# Patient Record
Sex: MALE | Race: WHITE | Employment: OTHER | URBAN - METROPOLITAN AREA
[De-identification: names, ages, dates, MRNs, and addresses within clinical notes are randomized per-mention and may not be internally consistent; named-entity substitution may affect disease eponyms.]

---

## 2021-10-26 ENCOUNTER — LAB ENCOUNTER (OUTPATIENT)
Dept: LAB | Facility: HOSPITAL | Age: 71
End: 2021-10-26
Attending: INTERNAL MEDICINE
Payer: MEDICARE

## 2021-10-26 DIAGNOSIS — I10 ESSENTIAL HYPERTENSION, MALIGNANT: Primary | ICD-10-CM

## 2021-10-26 DIAGNOSIS — Z87.891 PERSONAL HISTORY OF TOBACCO USE, PRESENTING HAZARDS TO HEALTH: ICD-10-CM

## 2021-10-26 DIAGNOSIS — I25.10 CORONARY ATHEROSCLEROSIS OF NATIVE CORONARY ARTERY: ICD-10-CM

## 2021-10-26 PROCEDURE — 85025 COMPLETE CBC W/AUTO DIFF WBC: CPT

## 2021-10-26 PROCEDURE — 80053 COMPREHEN METABOLIC PANEL: CPT

## 2021-10-26 PROCEDURE — 84443 ASSAY THYROID STIM HORMONE: CPT

## 2021-10-26 PROCEDURE — 36415 COLL VENOUS BLD VENIPUNCTURE: CPT

## 2021-10-26 PROCEDURE — 80061 LIPID PANEL: CPT

## 2023-02-14 ENCOUNTER — LAB ENCOUNTER (OUTPATIENT)
Dept: LAB | Age: 73
End: 2023-02-14
Attending: INTERNAL MEDICINE
Payer: MEDICARE

## 2023-02-14 DIAGNOSIS — I10 HTN (HYPERTENSION): ICD-10-CM

## 2023-02-14 DIAGNOSIS — E78.2 MIXED HYPERLIPIDEMIA: Primary | ICD-10-CM

## 2023-02-14 DIAGNOSIS — I25.10 CAD (CORONARY ARTERY DISEASE): ICD-10-CM

## 2023-02-14 LAB
ALBUMIN SERPL-MCNC: 3.9 G/DL (ref 3.4–5)
ALBUMIN/GLOB SERPL: 1 {RATIO} (ref 1–2)
ALP LIVER SERPL-CCNC: 82 U/L
ALT SERPL-CCNC: 45 U/L
ANION GAP SERPL CALC-SCNC: 2 MMOL/L (ref 0–18)
AST SERPL-CCNC: 29 U/L (ref 15–37)
BILIRUB SERPL-MCNC: 0.5 MG/DL (ref 0.1–2)
BUN BLD-MCNC: 19 MG/DL (ref 7–18)
CALCIUM BLD-MCNC: 9 MG/DL (ref 8.5–10.1)
CHLORIDE SERPL-SCNC: 105 MMOL/L (ref 98–112)
CHOLEST SERPL-MCNC: 185 MG/DL (ref ?–200)
CO2 SERPL-SCNC: 29 MMOL/L (ref 21–32)
CREAT BLD-MCNC: 0.98 MG/DL
FASTING PATIENT LIPID ANSWER: YES
FASTING STATUS PATIENT QL REPORTED: YES
GFR SERPLBLD BASED ON 1.73 SQ M-ARVRAT: 81 ML/MIN/1.73M2 (ref 60–?)
GLOBULIN PLAS-MCNC: 3.8 G/DL (ref 2.8–4.4)
GLUCOSE BLD-MCNC: 240 MG/DL (ref 70–99)
HDLC SERPL-MCNC: 48 MG/DL (ref 40–59)
LDLC SERPL CALC-MCNC: 113 MG/DL (ref ?–100)
NONHDLC SERPL-MCNC: 137 MG/DL (ref ?–130)
OSMOLALITY SERPL CALC.SUM OF ELEC: 292 MOSM/KG (ref 275–295)
POTASSIUM SERPL-SCNC: 4.5 MMOL/L (ref 3.5–5.1)
PROT SERPL-MCNC: 7.7 G/DL (ref 6.4–8.2)
SODIUM SERPL-SCNC: 136 MMOL/L (ref 136–145)
TRIGL SERPL-MCNC: 134 MG/DL (ref 30–149)
VLDLC SERPL CALC-MCNC: 23 MG/DL (ref 0–30)

## 2023-02-14 PROCEDURE — 80053 COMPREHEN METABOLIC PANEL: CPT

## 2023-02-14 PROCEDURE — 36415 COLL VENOUS BLD VENIPUNCTURE: CPT

## 2023-02-14 PROCEDURE — 80061 LIPID PANEL: CPT

## 2024-07-12 ENCOUNTER — LAB ENCOUNTER (OUTPATIENT)
Dept: LAB | Age: 74
End: 2024-07-12
Attending: INTERNAL MEDICINE
Payer: MEDICARE

## 2024-07-12 DIAGNOSIS — E78.2 MIXED HYPERLIPIDEMIA: Primary | ICD-10-CM

## 2024-07-12 DIAGNOSIS — I10 HTN (HYPERTENSION): ICD-10-CM

## 2024-07-12 LAB
ALBUMIN SERPL-MCNC: 3.9 G/DL (ref 3.4–5)
ALBUMIN/GLOB SERPL: 1 {RATIO} (ref 1–2)
ALP LIVER SERPL-CCNC: 82 U/L
ALT SERPL-CCNC: 36 U/L
ANION GAP SERPL CALC-SCNC: 5 MMOL/L (ref 0–18)
AST SERPL-CCNC: 16 U/L (ref 15–37)
BILIRUB SERPL-MCNC: 0.3 MG/DL (ref 0.1–2)
BUN BLD-MCNC: 25 MG/DL (ref 9–23)
CALCIUM BLD-MCNC: 9.4 MG/DL (ref 8.5–10.1)
CHLORIDE SERPL-SCNC: 103 MMOL/L (ref 98–112)
CHOLEST SERPL-MCNC: 186 MG/DL (ref ?–200)
CO2 SERPL-SCNC: 28 MMOL/L (ref 21–32)
CREAT BLD-MCNC: 1.17 MG/DL
EGFRCR SERPLBLD CKD-EPI 2021: 65 ML/MIN/1.73M2 (ref 60–?)
FASTING PATIENT LIPID ANSWER: YES
FASTING STATUS PATIENT QL REPORTED: YES
GLOBULIN PLAS-MCNC: 4 G/DL (ref 2.8–4.4)
GLUCOSE BLD-MCNC: 381 MG/DL (ref 70–99)
HDLC SERPL-MCNC: 55 MG/DL (ref 40–59)
LDLC SERPL CALC-MCNC: 97 MG/DL (ref ?–100)
NONHDLC SERPL-MCNC: 131 MG/DL (ref ?–130)
OSMOLALITY SERPL CALC.SUM OF ELEC: 302 MOSM/KG (ref 275–295)
POTASSIUM SERPL-SCNC: 4.4 MMOL/L (ref 3.5–5.1)
PROT SERPL-MCNC: 7.9 G/DL (ref 6.4–8.2)
SODIUM SERPL-SCNC: 136 MMOL/L (ref 136–145)
TRIGL SERPL-MCNC: 202 MG/DL (ref 30–149)
VLDLC SERPL CALC-MCNC: 33 MG/DL (ref 0–30)

## 2024-07-12 PROCEDURE — 36415 COLL VENOUS BLD VENIPUNCTURE: CPT

## 2024-07-12 PROCEDURE — 80053 COMPREHEN METABOLIC PANEL: CPT

## 2024-07-12 PROCEDURE — 80061 LIPID PANEL: CPT

## 2024-07-15 ENCOUNTER — HOSPITAL ENCOUNTER (INPATIENT)
Facility: HOSPITAL | Age: 74
LOS: 2 days | Discharge: HOME | End: 2024-07-17
Attending: EMERGENCY MEDICINE | Admitting: HOSPITALIST
Payer: MEDICARE

## 2024-07-15 ENCOUNTER — APPOINTMENT (OUTPATIENT)
Dept: MRI IMAGING | Facility: HOSPITAL | Age: 74
End: 2024-07-15
Attending: EMERGENCY MEDICINE
Payer: MEDICARE

## 2024-07-15 ENCOUNTER — APPOINTMENT (OUTPATIENT)
Dept: CT IMAGING | Facility: HOSPITAL | Age: 74
End: 2024-07-15
Attending: EMERGENCY MEDICINE
Payer: MEDICARE

## 2024-07-15 DIAGNOSIS — R29.810 FACIAL DROOP: Primary | ICD-10-CM

## 2024-07-15 DIAGNOSIS — E11.65 TYPE 2 DIABETES MELLITUS WITH HYPERGLYCEMIA, WITHOUT LONG-TERM CURRENT USE OF INSULIN (HCC): ICD-10-CM

## 2024-07-15 DIAGNOSIS — R26.81 GAIT INSTABILITY: ICD-10-CM

## 2024-07-15 PROBLEM — I10 HYPERTENSION: Status: ACTIVE | Noted: 2024-07-15

## 2024-07-15 PROBLEM — E11.9 DIABETES MELLITUS (HCC): Status: ACTIVE | Noted: 2024-07-15

## 2024-07-15 LAB
ALBUMIN SERPL-MCNC: 3.6 G/DL (ref 3.4–5)
ALBUMIN/GLOB SERPL: 0.8 {RATIO} (ref 1–2)
ALP LIVER SERPL-CCNC: 87 U/L
ALT SERPL-CCNC: 30 U/L
ANION GAP SERPL CALC-SCNC: 6 MMOL/L (ref 0–18)
APTT PPP: 26.8 SECONDS (ref 23–36)
AST SERPL-CCNC: 17 U/L (ref 15–37)
ATRIAL RATE: 60 BPM
BASOPHILS # BLD AUTO: 0.09 X10(3) UL (ref 0–0.2)
BASOPHILS NFR BLD AUTO: 0.9 %
BILIRUB SERPL-MCNC: 0.6 MG/DL (ref 0.1–2)
BILIRUB UR QL STRIP.AUTO: NEGATIVE
BUN BLD-MCNC: 26 MG/DL (ref 9–23)
CALCIUM BLD-MCNC: 9.5 MG/DL (ref 8.5–10.1)
CHLORIDE SERPL-SCNC: 104 MMOL/L (ref 98–112)
CLARITY UR REFRACT.AUTO: CLEAR
CO2 SERPL-SCNC: 26 MMOL/L (ref 21–32)
COLOR UR AUTO: YELLOW
CREAT BLD-MCNC: 1.03 MG/DL
EGFRCR SERPLBLD CKD-EPI 2021: 76 ML/MIN/1.73M2 (ref 60–?)
EOSINOPHIL # BLD AUTO: 0.57 X10(3) UL (ref 0–0.7)
EOSINOPHIL NFR BLD AUTO: 6 %
ERYTHROCYTE [DISTWIDTH] IN BLOOD BY AUTOMATED COUNT: 12.3 %
EST. AVERAGE GLUCOSE BLD GHB EST-MCNC: 249 MG/DL (ref 68–126)
GLOBULIN PLAS-MCNC: 4.3 G/DL (ref 2.8–4.4)
GLUCOSE BLD-MCNC: 263 MG/DL (ref 70–99)
GLUCOSE BLD-MCNC: 296 MG/DL (ref 70–99)
GLUCOSE BLD-MCNC: 343 MG/DL (ref 70–99)
GLUCOSE BLD-MCNC: 362 MG/DL (ref 70–99)
GLUCOSE UR STRIP.AUTO-MCNC: >1000 MG/DL
HBA1C MFR BLD: 10.3 % (ref ?–5.7)
HCT VFR BLD AUTO: 39.2 %
HGB BLD-MCNC: 14.2 G/DL
IMM GRANULOCYTES # BLD AUTO: 0.03 X10(3) UL (ref 0–1)
IMM GRANULOCYTES NFR BLD: 0.3 %
INR BLD: 0.9 (ref 0.8–1.2)
KETONES UR STRIP.AUTO-MCNC: NEGATIVE MG/DL
LEUKOCYTE ESTERASE UR QL STRIP.AUTO: NEGATIVE
LYMPHOCYTES # BLD AUTO: 2.61 X10(3) UL (ref 1–4)
LYMPHOCYTES NFR BLD AUTO: 27.4 %
MCH RBC QN AUTO: 32.4 PG (ref 26–34)
MCHC RBC AUTO-ENTMCNC: 36.2 G/DL (ref 31–37)
MCV RBC AUTO: 89.5 FL
MONOCYTES # BLD AUTO: 0.79 X10(3) UL (ref 0.1–1)
MONOCYTES NFR BLD AUTO: 8.3 %
NEUTROPHILS # BLD AUTO: 5.42 X10 (3) UL (ref 1.5–7.7)
NEUTROPHILS # BLD AUTO: 5.42 X10(3) UL (ref 1.5–7.7)
NEUTROPHILS NFR BLD AUTO: 57.1 %
NITRITE UR QL STRIP.AUTO: NEGATIVE
OSMOLALITY SERPL CALC.SUM OF ELEC: 300 MOSM/KG (ref 275–295)
P AXIS: 17 DEGREES
P-R INTERVAL: 160 MS
PH UR STRIP.AUTO: 5.5 [PH] (ref 5–8)
PLATELET # BLD AUTO: 210 10(3)UL (ref 150–450)
POTASSIUM SERPL-SCNC: 4.2 MMOL/L (ref 3.5–5.1)
PROT SERPL-MCNC: 7.9 G/DL (ref 6.4–8.2)
PROTHROMBIN TIME: 12.1 SECONDS (ref 11.6–14.8)
Q-T INTERVAL: 412 MS
QRS DURATION: 94 MS
QTC CALCULATION (BEZET): 412 MS
R AXIS: -31 DEGREES
RBC # BLD AUTO: 4.38 X10(6)UL
RBC UR QL AUTO: NEGATIVE
SODIUM SERPL-SCNC: 136 MMOL/L (ref 136–145)
SP GR UR STRIP.AUTO: >1.03 (ref 1–1.03)
T AXIS: 46 DEGREES
UROBILINOGEN UR STRIP.AUTO-MCNC: NORMAL MG/DL
VENTRICULAR RATE: 60 BPM
WBC # BLD AUTO: 9.5 X10(3) UL (ref 4–11)

## 2024-07-15 PROCEDURE — 99223 1ST HOSP IP/OBS HIGH 75: CPT | Performed by: STUDENT IN AN ORGANIZED HEALTH CARE EDUCATION/TRAINING PROGRAM

## 2024-07-15 PROCEDURE — 70551 MRI BRAIN STEM W/O DYE: CPT | Performed by: EMERGENCY MEDICINE

## 2024-07-15 PROCEDURE — 70498 CT ANGIOGRAPHY NECK: CPT | Performed by: EMERGENCY MEDICINE

## 2024-07-15 PROCEDURE — 70496 CT ANGIOGRAPHY HEAD: CPT | Performed by: EMERGENCY MEDICINE

## 2024-07-15 RX ORDER — ACETAMINOPHEN 500 MG
1000 TABLET ORAL EVERY 8 HOURS PRN
Status: DISCONTINUED | OUTPATIENT
Start: 2024-07-15 | End: 2024-07-17

## 2024-07-15 RX ORDER — AMLODIPINE BESYLATE 2.5 MG/1
2.5 TABLET ORAL 2 TIMES DAILY
COMMUNITY

## 2024-07-15 RX ORDER — ASPIRIN 325 MG
325 TABLET, DELAYED RELEASE (ENTERIC COATED) ORAL ONCE
Status: COMPLETED | OUTPATIENT
Start: 2024-07-15 | End: 2024-07-15

## 2024-07-15 RX ORDER — DEXTROSE MONOHYDRATE 25 G/50ML
50 INJECTION, SOLUTION INTRAVENOUS
Status: DISCONTINUED | OUTPATIENT
Start: 2024-07-15 | End: 2024-07-17

## 2024-07-15 RX ORDER — ASPIRIN 325 MG
325 TABLET ORAL DAILY
Status: DISCONTINUED | OUTPATIENT
Start: 2024-07-16 | End: 2024-07-17

## 2024-07-15 RX ORDER — ECHINACEA PURPUREA EXTRACT 125 MG
1 TABLET ORAL
Status: DISCONTINUED | OUTPATIENT
Start: 2024-07-15 | End: 2024-07-17

## 2024-07-15 RX ORDER — ATORVASTATIN CALCIUM 40 MG/1
40 TABLET, FILM COATED ORAL NIGHTLY
Status: DISCONTINUED | OUTPATIENT
Start: 2024-07-15 | End: 2024-07-15

## 2024-07-15 RX ORDER — METOPROLOL SUCCINATE 50 MG/1
50 TABLET, EXTENDED RELEASE ORAL
Status: DISCONTINUED | OUTPATIENT
Start: 2024-07-16 | End: 2024-07-17

## 2024-07-15 RX ORDER — ATORVASTATIN CALCIUM 40 MG/1
40 TABLET, FILM COATED ORAL NIGHTLY
Status: DISCONTINUED | OUTPATIENT
Start: 2024-07-15 | End: 2024-07-17

## 2024-07-15 RX ORDER — LABETALOL HYDROCHLORIDE 5 MG/ML
10 INJECTION, SOLUTION INTRAVENOUS EVERY 10 MIN PRN
Status: DISCONTINUED | OUTPATIENT
Start: 2024-07-15 | End: 2024-07-17

## 2024-07-15 RX ORDER — BENZONATATE 100 MG/1
200 CAPSULE ORAL 3 TIMES DAILY PRN
Status: DISCONTINUED | OUTPATIENT
Start: 2024-07-15 | End: 2024-07-17

## 2024-07-15 RX ORDER — BISACODYL 10 MG
10 SUPPOSITORY, RECTAL RECTAL
Status: DISCONTINUED | OUTPATIENT
Start: 2024-07-15 | End: 2024-07-17

## 2024-07-15 RX ORDER — LOSARTAN POTASSIUM 50 MG/1
50 TABLET ORAL DAILY
Status: DISCONTINUED | OUTPATIENT
Start: 2024-07-16 | End: 2024-07-17

## 2024-07-15 RX ORDER — AMLODIPINE BESYLATE 2.5 MG/1
2.5 TABLET ORAL 2 TIMES DAILY
Status: DISCONTINUED | OUTPATIENT
Start: 2024-07-15 | End: 2024-07-17

## 2024-07-15 RX ORDER — POLYETHYLENE GLYCOL 3350 17 G/17G
17 POWDER, FOR SOLUTION ORAL DAILY PRN
Status: DISCONTINUED | OUTPATIENT
Start: 2024-07-15 | End: 2024-07-17

## 2024-07-15 RX ORDER — SENNOSIDES 8.6 MG
17.2 TABLET ORAL NIGHTLY PRN
Status: DISCONTINUED | OUTPATIENT
Start: 2024-07-15 | End: 2024-07-17

## 2024-07-15 RX ORDER — DEXTROSE MONOHYDRATE 25 G/50ML
50 INJECTION, SOLUTION INTRAVENOUS
Status: CANCELLED | OUTPATIENT
Start: 2024-07-15

## 2024-07-15 RX ORDER — LOSARTAN POTASSIUM 50 MG/1
50 TABLET ORAL DAILY
COMMUNITY

## 2024-07-15 RX ORDER — ASPIRIN 300 MG/1
300 SUPPOSITORY RECTAL DAILY
Status: DISCONTINUED | OUTPATIENT
Start: 2024-07-16 | End: 2024-07-17

## 2024-07-15 RX ORDER — NICOTINE POLACRILEX 4 MG
15 LOZENGE BUCCAL
Status: CANCELLED | OUTPATIENT
Start: 2024-07-15

## 2024-07-15 RX ORDER — INSULIN ASPART 100 [IU]/ML
5 INJECTION, SOLUTION INTRAVENOUS; SUBCUTANEOUS ONCE
Status: COMPLETED | OUTPATIENT
Start: 2024-07-15 | End: 2024-07-15

## 2024-07-15 RX ORDER — METOCLOPRAMIDE HYDROCHLORIDE 5 MG/ML
10 INJECTION INTRAMUSCULAR; INTRAVENOUS EVERY 8 HOURS PRN
Status: DISCONTINUED | OUTPATIENT
Start: 2024-07-15 | End: 2024-07-17

## 2024-07-15 RX ORDER — NICOTINE POLACRILEX 4 MG
15 LOZENGE BUCCAL
Status: DISCONTINUED | OUTPATIENT
Start: 2024-07-15 | End: 2024-07-17

## 2024-07-15 RX ORDER — NICOTINE POLACRILEX 4 MG
30 LOZENGE BUCCAL
Status: CANCELLED | OUTPATIENT
Start: 2024-07-15

## 2024-07-15 RX ORDER — HYDRALAZINE HYDROCHLORIDE 20 MG/ML
10 INJECTION INTRAMUSCULAR; INTRAVENOUS EVERY 2 HOUR PRN
Status: DISCONTINUED | OUTPATIENT
Start: 2024-07-15 | End: 2024-07-17

## 2024-07-15 RX ORDER — ONDANSETRON 2 MG/ML
4 INJECTION INTRAMUSCULAR; INTRAVENOUS EVERY 6 HOURS PRN
Status: DISCONTINUED | OUTPATIENT
Start: 2024-07-15 | End: 2024-07-17

## 2024-07-15 RX ORDER — NICOTINE POLACRILEX 4 MG
30 LOZENGE BUCCAL
Status: DISCONTINUED | OUTPATIENT
Start: 2024-07-15 | End: 2024-07-17

## 2024-07-15 RX ORDER — INSULIN DEGLUDEC 100 U/ML
10 INJECTION, SOLUTION SUBCUTANEOUS NIGHTLY
Status: DISCONTINUED | OUTPATIENT
Start: 2024-07-16 | End: 2024-07-16

## 2024-07-15 RX ORDER — ENOXAPARIN SODIUM 100 MG/ML
40 INJECTION SUBCUTANEOUS DAILY
Status: DISCONTINUED | OUTPATIENT
Start: 2024-07-16 | End: 2024-07-17

## 2024-07-15 RX ORDER — HYDRALAZINE HYDROCHLORIDE 20 MG/ML
5 INJECTION INTRAMUSCULAR; INTRAVENOUS EVERY 6 HOURS PRN
Status: DISCONTINUED | OUTPATIENT
Start: 2024-07-15 | End: 2024-07-17

## 2024-07-15 RX ORDER — METOPROLOL SUCCINATE 50 MG/1
50 TABLET, EXTENDED RELEASE ORAL DAILY
COMMUNITY

## 2024-07-15 RX ORDER — ENEMA 19; 7 G/133ML; G/133ML
1 ENEMA RECTAL ONCE AS NEEDED
Status: DISCONTINUED | OUTPATIENT
Start: 2024-07-15 | End: 2024-07-17

## 2024-07-15 RX ORDER — MELATONIN
3 NIGHTLY PRN
Status: DISCONTINUED | OUTPATIENT
Start: 2024-07-15 | End: 2024-07-17

## 2024-07-15 RX ORDER — MECLIZINE HYDROCHLORIDE 25 MG/1
25 TABLET ORAL 2 TIMES DAILY
COMMUNITY

## 2024-07-15 RX ORDER — SODIUM CHLORIDE 9 MG/ML
INJECTION, SOLUTION INTRAVENOUS CONTINUOUS
Status: DISCONTINUED | OUTPATIENT
Start: 2024-07-15 | End: 2024-07-17

## 2024-07-15 NOTE — ED INITIAL ASSESSMENT (HPI)
Patient states his blood pressure medication Amlodipine was doubled on Wednesday, took a nap, woke up and felt \"drunk as a skunk.\" Off balance. Seen by his PCP on Friday, given Meclizine, with no relief. Labs drawn on Friday, states they were normal other than YT=198. No history of diabetes. States his voice is very weak. Vomiting on Wednesday. Urinating every hour and very thirsty. Patient has pain across upper back, neck.

## 2024-07-16 ENCOUNTER — APPOINTMENT (OUTPATIENT)
Dept: CV DIAGNOSTICS | Facility: HOSPITAL | Age: 74
End: 2024-07-16
Attending: STUDENT IN AN ORGANIZED HEALTH CARE EDUCATION/TRAINING PROGRAM
Payer: MEDICARE

## 2024-07-16 LAB
CHOLEST SERPL-MCNC: 140 MG/DL (ref ?–200)
GLUCOSE BLD-MCNC: 244 MG/DL (ref 70–99)
GLUCOSE BLD-MCNC: 260 MG/DL (ref 70–99)
GLUCOSE BLD-MCNC: 261 MG/DL (ref 70–99)
GLUCOSE BLD-MCNC: 307 MG/DL (ref 70–99)
HDLC SERPL-MCNC: 43 MG/DL (ref 40–59)
LDLC SERPL CALC-MCNC: 63 MG/DL (ref ?–100)
NONHDLC SERPL-MCNC: 97 MG/DL (ref ?–130)
TRIGL SERPL-MCNC: 207 MG/DL (ref 30–149)
VLDLC SERPL CALC-MCNC: 31 MG/DL (ref 0–30)

## 2024-07-16 PROCEDURE — 99233 SBSQ HOSP IP/OBS HIGH 50: CPT | Performed by: HOSPITALIST

## 2024-07-16 PROCEDURE — 93306 TTE W/DOPPLER COMPLETE: CPT | Performed by: STUDENT IN AN ORGANIZED HEALTH CARE EDUCATION/TRAINING PROGRAM

## 2024-07-16 PROCEDURE — 99233 SBSQ HOSP IP/OBS HIGH 50: CPT | Performed by: CLINICAL NURSE SPECIALIST

## 2024-07-16 PROCEDURE — 99223 1ST HOSP IP/OBS HIGH 75: CPT | Performed by: OTHER

## 2024-07-16 RX ORDER — BLOOD-GLUCOSE METER
EACH MISCELLANEOUS
Qty: 1 KIT | Refills: 0 | Status: SHIPPED | OUTPATIENT
Start: 2024-07-16 | End: 2024-07-17

## 2024-07-16 RX ORDER — BLOOD-GLUCOSE METER
EACH MISCELLANEOUS
Qty: 1 KIT | Refills: 0 | Status: SHIPPED | OUTPATIENT
Start: 2024-07-16 | End: 2024-07-16

## 2024-07-16 RX ORDER — CLOPIDOGREL BISULFATE 75 MG/1
75 TABLET ORAL DAILY
Status: DISCONTINUED | OUTPATIENT
Start: 2024-07-16 | End: 2024-07-17

## 2024-07-16 RX ORDER — LANCETS
EACH MISCELLANEOUS
Qty: 102 EACH | Refills: 6 | Status: SHIPPED | OUTPATIENT
Start: 2024-07-16 | End: 2024-07-16

## 2024-07-16 RX ORDER — BLOOD SUGAR DIAGNOSTIC
STRIP MISCELLANEOUS
Qty: 100 STRIP | Refills: 6 | Status: SHIPPED | OUTPATIENT
Start: 2024-07-16 | End: 2024-07-16

## 2024-07-16 RX ORDER — LANCETS 33 GAUGE
EACH MISCELLANEOUS
Qty: 200 EACH | Refills: 6 | Status: SHIPPED | OUTPATIENT
Start: 2024-07-16 | End: 2024-07-16

## 2024-07-16 RX ORDER — BLOOD SUGAR DIAGNOSTIC
STRIP MISCELLANEOUS
Qty: 200 STRIP | Refills: 6 | Status: SHIPPED | OUTPATIENT
Start: 2024-07-16 | End: 2024-07-16

## 2024-07-16 RX ORDER — INSULIN DEGLUDEC 100 U/ML
18 INJECTION, SOLUTION SUBCUTANEOUS NIGHTLY
Status: DISCONTINUED | OUTPATIENT
Start: 2024-07-16 | End: 2024-07-17

## 2024-07-16 RX ORDER — LANCETS 33 GAUGE
EACH MISCELLANEOUS
Qty: 100 EACH | Refills: 6 | Status: SHIPPED | OUTPATIENT
Start: 2024-07-16 | End: 2024-07-17

## 2024-07-16 RX ORDER — ASPIRIN 325 MG
325 TABLET ORAL DAILY
Qty: 30 TABLET | Refills: 1 | Status: SHIPPED | OUTPATIENT
Start: 2024-07-17

## 2024-07-16 RX ORDER — PEN NEEDLE, DIABETIC 32GX 5/32"
NEEDLE, DISPOSABLE MISCELLANEOUS
Qty: 100 EACH | Refills: 6 | Status: SHIPPED | OUTPATIENT
Start: 2024-07-16 | End: 2024-07-16

## 2024-07-16 RX ORDER — BLOOD SUGAR DIAGNOSTIC
STRIP MISCELLANEOUS
Qty: 200 STRIP | Refills: 6 | Status: SHIPPED | OUTPATIENT
Start: 2024-07-16 | End: 2024-07-17

## 2024-07-16 RX ORDER — LANCETS 33 GAUGE
EACH MISCELLANEOUS
Qty: 100 EACH | Refills: 6 | Status: SHIPPED | OUTPATIENT
Start: 2024-07-16 | End: 2024-07-16

## 2024-07-16 RX ORDER — INSULIN ASPART 100 [IU]/ML
INJECTION, SOLUTION INTRAVENOUS; SUBCUTANEOUS
Qty: 5 EACH | Refills: 3 | Status: SHIPPED | OUTPATIENT
Start: 2024-07-16

## 2024-07-16 RX ORDER — PEN NEEDLE, DIABETIC 32GX 5/32"
NEEDLE, DISPOSABLE MISCELLANEOUS
Qty: 100 EACH | Refills: 6 | Status: SHIPPED | OUTPATIENT
Start: 2024-07-16 | End: 2024-07-17

## 2024-07-16 RX ORDER — INSULIN GLARGINE 100 [IU]/ML
18 INJECTION, SOLUTION SUBCUTANEOUS NIGHTLY
Qty: 5 EACH | Refills: 0 | Status: SHIPPED | OUTPATIENT
Start: 2024-07-16 | End: 2024-07-17

## 2024-07-16 RX ORDER — CLOPIDOGREL BISULFATE 75 MG/1
75 TABLET ORAL DAILY
Qty: 30 TABLET | Refills: 1 | Status: SHIPPED | OUTPATIENT
Start: 2024-07-16

## 2024-07-16 NOTE — ED PROVIDER NOTES
Patient Seen in: [facility] Emergency Department      History     Chief Complaint   Patient presents with    Dizziness    Hyperglycemia     Stated Complaint: feeling dizzy/off balance, weak voice, and fatigue since Wednesday. Also report*    Subjective:   HPI    1 week of dizziness further described as gait instability.  No shahbaz vertiginous sensation.  No numbness or weakness in the arms but the wife feels like there is a left-sided facial droop.  No word finding difficulty.  No double vision no visual field deficits.    The gait instability is constant present since it started.    Wife feels that the facial droop is waxing and waning.    Objective:   Past Medical History:    CAD (coronary artery disease)    Hypertension              History reviewed. No pertinent surgical history.             Social History     Socioeconomic History    Marital status:    Tobacco Use    Smoking status: Former     Current packs/day: 0.00     Types: Cigarettes     Quit date: 9/27/2013     Years since quitting: 10.8   Substance and Sexual Activity    Alcohol use: Yes              Review of Systems    Positive for stated Chief Complaint: Dizziness and Hyperglycemia    Other systems are as noted in HPI.  Constitutional and vital signs reviewed.      All other systems reviewed and negative except as noted above.    Physical Exam     ED Triage Vitals   BP 07/15/24 1521 148/88   Pulse 07/15/24 1521 69   Resp 07/15/24 1521 18   Temp 07/15/24 1522 98.4 °F (36.9 °C)   Temp src 07/15/24 1522 Oral   SpO2 07/15/24 1522 96 %   O2 Device 07/15/24 1522 None (Room air)       Current Vitals:   Vital Signs  BP: 133/86  Pulse: 58  Resp: 21  Temp: 98.4 °F (36.9 °C)  Temp src: Oral  MAP (mmHg): (!) 101    Oxygen Therapy  SpO2: 96 %  O2 Device: None (Room air)            Physical Exam      Constitutional:  Appears well-developed and well-nourished. no Distress.  Head: Normocephalic and atraumatic.   Nose: Nose normal.   Eyes: EOM are normal.  Pupils are equal, round, and reactive to light.   Neck: Normal range of motion. Neck supple. No JVD present.   Cardiovascular: Normal rate and regular rhythm.    Pulmonary/Chest: Effort normal and breath sounds normal. No stridor.   Abdominal: Soft. There is no tenderness. There is no guarding.   Musculoskeletal: Exhibits no edema or tenderness.     Neurological: Pt is oriented to person, place, and time.    There are no cranial nerve deficits.    There is no nystagmus.  Speech is fluent and not slurred.   There is no word finding difficulty.    No neglect. No gaze  No visual field deficit.    There is no pronator drift.    Strength is 5 out of 5 in the upper extremities bilaterally.    Sensation is symmetric in the upper extremities and not diminished.    There is no lower extremity drift  Sensation is normal in the lower extremities and not diminished.  It is intact to fine touch.    There is full strength with hip flexion, knee flexion and extension, ankle plantarflexion, and at the EHL.  This is true bilaterally.      Finger to nose intact. '  Gait deferred      Skin: Skin is warm and dry.   Psychiatric: Normal mood and affect. Thought content normal.       ED Course     Labs Reviewed   COMP METABOLIC PANEL (14) - Abnormal; Notable for the following components:       Result Value    Glucose 343 (*)     BUN 26 (*)     Calculated Osmolality 300 (*)     A/G Ratio 0.8 (*)     All other components within normal limits   URINALYSIS, ROUTINE - Abnormal; Notable for the following components:    Spec Gravity >1.030 (*)     Glucose Urine >1000 (*)     Protein Urine Trace (*)     All other components within normal limits   HEMOGLOBIN A1C - Abnormal; Notable for the following components:    HgbA1C 10.3 (*)     Estimated Average Glucose 249 (*)     All other components within normal limits   POCT GLUCOSE - Abnormal; Notable for the following components:    POC Glucose 362 (*)     All other components within normal limits    POCT GLUCOSE - Abnormal; Notable for the following components:    POC Glucose 263 (*)     All other components within normal limits   PROTHROMBIN TIME (PT) - Normal   PTT, ACTIVATED - Normal   CBC WITH DIFFERENTIAL WITH PLATELET    Narrative:     The following orders were created for panel order CBC With Differential With Platelet.  Procedure                               Abnormality         Status                     ---------                               -----------         ------                     CBC W/ DIFFERENTIAL[[id number]]                               Final result                 Please view results for these tests on the individual orders.   RAINBOW DRAW LAVENDER   RAINBOW DRAW LIGHT GREEN   RAINBOW DRAW BLUE   CBC W/ DIFFERENTIAL     EKG    Rate, intervals and axes as noted on EKG Report.  Rate: 60  Rhythm: Sinus Rhythm  Reading: Sinus rhythm no acute ischemia                 CTA BRAIN + CTA CAROTIDS (CPT=70496/12805)    Result Date: 7/15/2024  CONCLUSION:  No acute intracranial abnormality.  Patent intracranial and cervical arterial vasculature, without large vessel occlusion or aneurysm.    LOCATION:  [facility]   Dictated by (CST): Rangel Healy MD on 7/15/2024 at 5:51 PM     Finalized by (CST): Rangel Healy MD on 7/15/2024 at 5:56 PM        Fluoroscopy notable hyperglycemia without DKA.  Fluid, insulin ordered           MDM          Differential diagnoses considered: Stroke, TIA, peripheral vertigo, vertebral artery insufficiency.    -CT/CTA negative for bleeding, mass, vessel abnormality.  -Concern for stroke persist.  MRI ordered.  -Given inability to ambulate will admit.    Patient will be admitted primarily to the [facility] hospitalist.    Care has been discussed with the admitting physician.  Last known well was over 5 days ago.  Stroke scale is 0.    Not a candidate for tPA or lytics.      I visualized the radiology studies, my independent interpretation: Intracranial hemorrhage noted on CT scan  of brain    *Discussion of ongoing management of this patient's care included:  patient will be admitted primarily to the [facility] hospitalist.  *Comorbidities contributing to the complexity of decision making: n/a  *External charts reviewed: n/a  *Additional sources of history: n/a    Shared decision making was done by: patient, myself.    Admission disposition: 7/15/2024  7:19 PM                                        Medical Decision Making      Disposition and Plan     Clinical Impression:  1. Facial droop    2. Gait instability         Disposition:  Admit  7/15/2024  7:19 pm    Follow-up:  No follow-up provider specified.        Medications Prescribed:  Current Discharge Medication List                            Hospital Problems       Present on Admission             ICD-10-CM Noted POA    * (Principal) Facial droop R29.810 7/15/2024 Unknown    Diabetes mellitus (HCC) E11.9 7/15/2024 Unknown    Hypertension I10 7/15/2024 Unknown

## 2024-07-16 NOTE — ED QUICK NOTES
Orders for admission, patient is aware of plan and ready to go upstairs. Any questions, please call ED RN Arianne at extension [phone]    Vaccinated? na  Type of COVID test sent:  COVID Suspicion level: n/a      Titratable drug(s) infusing:   Rate: n/a    LOC at time of transport: A&Ox4    Other pertinent information:     CIWA score=  NIH score=

## 2024-07-16 NOTE — HISTORICAL OFFICE NOTE
Facility Logo [facility]  [address], 4th floor, [place] [address]  [phone]      Tahir Kapoor  Progress Note  Demographics:  Name: Tahir Kapoor YOB: 1950  Age: 73, Male Medical Record No: [id number]  Visited Date/Time: 07/10/2023 10:40 AM    Chief Complaints  3 Month follow up   Blood pressure   History of Present Illness  No recent hospitalization.    He was noted to have elevated blood pressure prior to oral surgery procedure and thus referred to cardiology for further evaluation recommendations.  He has infection in teeth/gum but in no pain.  He has been making conscious effort in eating more vegatables, less red meat and watching sodium intake, lost ~20 pounds.     No chest pain, dyspnea, orthopnea, PND or increased lower extremity pain/swelling.  No palpitations, presyncope or syncope.  No fever, nausea/vomiting, diarrhea or urinary complaints.    HR 50-60s and reviewed log BP with higher reading in AM and afternoon with SBP in the evening 120-140 mmHg and DBP 60-70s  Cardiac risk factors Hypertension, Dyslipidemia and Former smoker    Past Medical History  1.Hyperlipidemia, mixed  2.Hypertension, uncontrolled  3.CAD native (coronary artery disease)  4.History of smoking    Past Surgical History  1.Post PTCA  2.STEMI RCA - right coronary artery (ST elevation myocardial infarct)  3.S/P drug eluting coronary stent placement - DESC  Family History  Family history unknown.    Social History  Smoking status Former [id number]/10/2010 (End Date)  Tobacco usage - No (Non-smoker (finding))  Alcohol usage - Yes (rare every couple weeks )  Review of systems  Constitutional No history of Fatigue  Cardiovascular No history of Chest pain, TIWARI, Palpitations, Syncope, PND, Orthopnea, Edema and Claudication  Musculoskeletal No history of Myalgias and Muscle weakness  Respiratory No history of SOB  Neurological No history of Numbness and Dizziness  Physical  Examination  Vitals Left Arm Sitting  / 84 mmHg, Pulse rate 70 bpm, Height in 5' 9\", BMI: 26.7, Weight in 181 lbs (or) 82 kgs and BSA : 2.01 cc/m²  General Appearance No Acute Distress and Appropriate  Head/Eyes/Ears/Nose/Mouth/Throat Conjunctiva pink, Sclera Clear and Mucous membranes Moist  Neck Normal carotid pulsations, No carotid bruits and No JVD  Respiratory Unlabored, Lungs clear with normal breath sounds and Equal bilaterally  Cardiovascular Intact distal pulses and Regular rhythm. Normal rate present. Normal and normal S1 and S2    Gastrointestinal Abdomen soft and Non-tender  Musculoskeletal Normal spine  Gait Normal gait  Lower Extremities Pulses 2+ and equal bilaterally and No edema  Skin Warm and dry and Intact  Neurologic / Psychiatric Alert and Oriented  Speech Normal speech  EKG/Other abnormalities  [facility] nuclear stress 21 abnormal but only infarct with medium size defect in inferior wall    [facility] US renal artery 21 negative for TATE    [facility] echo 21 LVEF 58%, mild LVH, normal RV, ascending aorta 4.0 cm and aortic root 3.9 cm, no valvular pathology    [facility] labs 23 08:50  Glucose: 240 (H) Sodium: 136 Potassium: 4.5  BUN: 19 (H) CREATININE: 0.98  AST (SGOT): 29 ALT (SGPT): 45  Tholesterol, Total: 185 Triglycerides: 134 HDL Cholesterol: 48 LDL Cholesterol Ca (H)    Results for LUIS F KERR (MRN [id number]) 10/26/2021 09:22  Glucose: 176 (H) Sodium: 139 Potassium: 4.3  BUN: 14 CREATININE: 0.99  AST (SGOT): 21 ALT (SGPT): 43  Cholesterol, Total: 234 (H) Triglycerides: 181 (H) HDL Cholesterol: 45 LDL Cholesterol Ca (H)  TSH: 1.640  WBC: 9.6 Hemoglobin: 14.3 Hematocrit: 42.2 Platelet Count: 240.0  Allergies  No known medication allergies.    Medications (Info obtained by: Verbal)  1.amLODIPine 2.5 mg tablet, Take 1 tablet orally 2 times a day.  2.aspirin 81 mg tablet,delayed release, Take 1 tablet orally once a day.  3.atorvastatin 40 mg tablet, Take 1 tablet orally  once a day at night.  4.losartan 50 mg tablet, Take 1 tablet orally once a day.  5.Metoprolol Succinate ER 50 MG Oral Tablet Extended Release 24 Hour, Take 1 tablet by mouth once daily    Impression  1.Cardiac Risk Assessment exam, preop  2.Dilation of thoracic aorta  3.Hypertension, uncontrolled  4.Post PTCA  5.STEMI RCA - right coronary artery (ST elevation myocardial infarct)  6.S/P drug eluting coronary stent placement - DESC  7.CAD native (coronary artery disease)    Assessment & Plan  73-year-old gentleman prior history of coronary disease with drug-eluting stent to RCA in approximately 2013 at [facility].  He had not follow-up with a cardiologist regularly.  He was noted to have elevated blood pressure prior to oral surgery.    Structural/ischemic evaluation of heart with elevated blood pressure was reassuring late 2021 and suggest f/u in late 2023.    BP limited in increasing Toprol XL due to low HR and started ARB due to mild dilated thoracic aorta and mild LVH.  Will need to increase meds considering increase losartan or amlodipine.    Reviewed previous labs and would benefit from resumption of statin ie atorvastatin 40 mg nightly.  Goal LDL less than 100 but will watch as he makes diet changes.    Will consider PV screening when available through the office.    Plan:  -taking aspirin 81 mg daily  -continue amlodipine 2.5 mg to twice a day (increased at last office visit)  -continue aspirin 81 mg daily  -continue Toprol XL 50 mg but take in the morning  -continue losartan 50 mg in the evening  -continue atorvastatin 40 mg nightly  -follow-up with primary care physician for elevated blood sugar and lab work  -Monitor blood pressure and call if systolic blood pressure greater than 130 mmHg consistently and/or diastolic blood pressure greater than 85 mmHg. DASH diet and blood pressure monitoring information given.  Staff will give DASH initial information.  -cardiac nuclear PET just prior to follow-up  with Dr. Alexander in 6 months    Recommend low sodium diet, daily exercise and maintain a normal BMI. Recommend monitor blood pressure at home.  Labs and Diagnostics ordered  1.Cardiac PET/CT Scan (76887) (5 Months)  Future appointments  1.Follow up visit - Dewayne Alexander (6 Months)  Miscellaneous  1.Reviewed myocardial perfusion imaging, renal vascular ultrasound, trans thoracic echocardiogram with the patient.  Nurses documentation  Upcoming surgeries: none   Use of assistive devices(s): none   Triage & medication list reviewed by: MICHELE   Refills completed: non needed      Patient instructions  Plan:  -taking aspirin 81 mg daily  -continue amlodipine 2.5 mg to twice a day (increased at last office visit)  -continue aspirin 81 mg daily  -continue Toprol XL 50 mg but take in the morning  -continue losartan 50 mg in the evening  -continue atorvastatin 40 mg nightly  -follow-up with primary care physician for elevated blood sugar and lab work  -Monitor blood pressure and call if systolic blood pressure greater than 130 mmHg consistently and/or diastolic blood pressure greater than 85 mmHg. DASH diet and blood pressure monitoring information given.  Staff will give DASH initial information.  -cardiac nuclear PET just prior to follow-up with Dr. Alexander in 6 months    PET Perfusion study : 5 months   Your provider has ordered a nuclear PET perfusion study. This is a stress test for your heart. It will help detect the presence of blockages in your heart. Please refer to the Patient Guide for Perfusion Study for further information.             It is important not to have any caffeine for 12 hours before the test.  Caffeine includes soft drinks, tea, coffee - including decaffeinated products, chocolate, energy drinks, diet pills, Excedrin, Anacin, Butalbital, Fioricet, and some other migraine medications.             Do not have anything to eat or drink for 6-8 hours prior to your test.      Follow up appointment unscheduled:   Your  provider has requested for you to follow up in 6 months .  We will call you to schedule the appointment.  It is always important to bring all your medications including over the counter medications, vitamins and supplements to your doctor visits. This will assist in providing the best care for your condition. Please bring your insurance cards to your appointment.     DIET AND EXERCISE:  Diet and Exercise:   Your provider recommends a specialized meal plan as well as exercise.  Please see the attached form from the [employer] in regards to meal plan and exercise.  Your Provider recommends a MILD/MODERATE/VIGEROUS exercise program.     Recommend low sodium diet, daily exercise and maintain a normal BMI. Recommend monitor blood pressure at home.  Diagnostics Details  Exercise Myocardial Perfusion Imaging 11/09/2021  1.Stress EKG is normal.    2.The heart rate recovery is normal.    1.This is an abnormal perfusion study. Study is consistent with prior infarction.    2.This scan is suggestive of moderate risk for future cardiovascular events.    3.Medium fixed perfusion abnormality of severe intensity in the inferior region.    4.The left ventricular cavity is noted to be normal on the stress study. The left ventricular ejection fraction was calculated to be 43% and left ventricular global function is mildly reduced.    5.The study quality is good.    Renal Vascular Ultrasound 11/02/2021  1.The study quality is good.    2.No evidence of significant renal artery stenosis in the visualized bilateral renal arteries.    3.No evidence of abdominal aorta aneurysm or ectasia.    4.Kidney resistive indices are within normal limits bilaterally.    5.Renal veins are patent bilaterally.    Trans Thoracic Echocardiogram 11/02/2021  1.The study quality is average.    2.The left ventricle is normal in size. Global left ventricular systolic function is normal. The left ventricular ejection fraction is 58%. The left  ventricle diastolic function is impaired (Grade I) with normal left atrial pressure. Mild concentric left ventricular hypertrophy is present. No regional wall motion abnormality.    3.The right ventricle is normal in size. Right ventricular systolic function is normal.    4.The left atrial diameter is mildly increased.    5.Ascending aorta diameter is 4.0 cms. Aortic root diameter is 3.9 cms.    6.No significant valvular regurgitation or stenosis.    CPOE Orders carried out by: Tonny  Care Providers: Dewayne Alexander MD, Tonny  Electronically Authenticated by  Dewayne Alexander MD  08/08/2023 11:43:17 AM  Disclaimer: Components of this note were documented using voice recognition system and are subject to errors not corrected at proofreading. Contact the author of this note for any clarifications.

## 2024-07-16 NOTE — HISTORICAL OFFICE NOTE
Facility Logo [facility]  [address] [facility], 4th [place] [address]  [phone]      Tahir Kapoor  Progress Note  Demographics:  Name: Tahir Kapoor YOB: 1950  Age: 74, Male Medical Record No: [id number]    Visited Date/Time: 07/12/2024 11:00 AM      Chief Complaints  follow up     History of Present Illness  pt saw us on Monday and when he took the new medication amlodipine he got dizzy and sick to his stomach  pt is asking if he should still take this medication as today he has not taken it as he feels better    No fever, diarrhea or urinary complaints.  No further nausea/vomiting.    Cardiac risk factors Hypertension, Dyslipidemia and Former smoker  Past Medical History  1.Hyperlipidemia, mixed  2.Hypertension, uncontrolled  3.CAD native (coronary artery disease)  4.History of smoking  Past Surgical History  1.Post PTCA  2.STEMI RCA - right coronary artery (ST elevation myocardial infarct)  3.S/P drug eluting coronary stent placement - DESC    Family History  Family history unknown.  Social History  Smoking status Former [id number] (End Date)  Tobacco usage - No (Ex-cigarette smoker (finding))  Alcohol usage - Yes (moderate)    Review of systems  Cardiovascular No history of Chest pain, TIWARI, Palpitations, Syncope, PND, Orthopnea, Edema and Claudication    Physical Examination  Vitals Right Arm Sitting  / 80 mmHg, Pulse rate 73 bpm, Regular, Height in 5' 8\", BMI: 30.5, Weight in 200.6 lbs (or) 90.99 kgs and BSA : 2.12 cc/m²  General Appearance No Acute Distress and Appropriate  Head/Eyes/Ears/Nose/Mouth/Throat Conjunctiva pink, Sclera Clear and Mucous membranes Moist  Neck Normal carotid pulsations, No carotid bruits and No JVD  Respiratory Unlabored, Lungs clear with normal breath sounds and Equal bilaterally  Cardiovascular Intact distal pulses and Regular rhythm. Normal rate present. Normal and normal S1 and S2  Gastrointestinal Abdomen soft and  Non-tender  Gait Walks with a cane  Lower Extremities Pulses 2+ and equal bilaterally and No edema  Skin Warm and dry and Intact  Neurologic / Psychiatric Alert and Oriented  Speech Normal speech    EKG/Other abnormalities  labs just done this morning and results not available at office visit    [facility] nuclear stress 21 abnormal but only infarct with medium size defect in inferior wall    [facility] US renal artery 21 negative for TATE    [facility] echo 21 LVEF 58%, mild LVH, normal RV, ascending aorta 4.0 cm and aortic root 3.9 cm, no valvular pathology    [facility] labs 23 08:50  Glucose: 240 (H) Sodium: 136 Potassium: 4.5  BUN: 19 (H) CREATININE: 0.98  AST (SGOT): 29 ALT (SGPT): 45  Tholesterol, Total: 185 Triglycerides: 134 HDL Cholesterol: 48 LDL Cholesterol Ca (H)    Results for LUIS F KERR (MRN [id number]) 10/26/2021 09:22  Glucose: 176 (H) Sodium: 139 Potassium: 4.3  BUN: 14 CREATININE: 0.99  AST (SGOT): 21 ALT (SGPT): 43  Cholesterol, Total: 234 (H) Triglycerides: 181 (H) HDL Cholesterol: 45 LDL Cholesterol Ca (H)  TSH: 1.640  WBC: 9.6 Hemoglobin: 14.3 Hematocrit: 42.2 Platelet Count: 240.0    Allergies  No known medication allergies.    Medications (Info obtained by: Verbal)  1.amLODIPine 2.5 mg tablet, Take 2 tablets orally 2 times a day.  2.aspirin 81 mg tablet,delayed release, Take 1 tablet orally once a day.  3.Atorvastatin Calcium 40 MG Oral Tablet, TAKE 1 TABLET BY MOUTH ONCE DAILY AT NIGHT  4.Losartan Potassium 50 MG Oral Tablet, Take 1 tablet by mouth once daily  5.metoprolol succinate ER 50 mg tablet,extended release 24 hr, Take 1 tablet orally once a day.    Impression  1.Cardiac Risk Assessment exam, preop  2.Dilation of thoracic aorta  3.Hypertension, uncontrolled  4.Post PTCA  5.STEMI RCA - right coronary artery (ST elevation myocardial infarct)  6.S/P drug eluting coronary stent placement - DESC  7.CAD native (coronary artery disease)    Assessment & Plan  74-year-old  gentleman prior history of coronary disease with drug-eluting stent to RCA in approximately 2013 at [facility].  He had not follow-up with a cardiologist regularly.  He was noted to have elevated blood pressure prior to oral surgery.    Structural/ischemic evaluation of heart with elevated blood pressure was reassuring late 2021 and suggest f/u in late 2023.    BP limited in increasing Toprol XL due to low bradycardia.    Reviewed previous labs and would benefit from resumption of statin ie atorvastatin 40 mg nightly.  Goal LDL less than 100 but will watch as he makes diet changes.    Will consider PV screening when available through the office.    Plan:  -will give meclizine 25 mg can take upto two times a day for dizziness #30 tablets with no refill  -need to see PCP within a week or further evaluation at immediate care or ER for vertigo symptoms and hoarse voice after vomiting  -keep enrollment appointment with remote patient monitoring for hypertension on 7/18/24  -continue aspirin 81 mg daily  -go back to amlodipine 2.5 mg to twice a day   -continue Toprol XL 50 mg in the morning (limited in increasing due to low heart rate)  -continue losartan 50 mg in the afternoon  -continue atorvastatin 40 mg nightly  -Monitor blood pressure and call if systolic blood pressure greater than 130 mmHg consistently and/or diastolic blood pressure greater than 85 mmHg. DASH diet and blood pressure monitoring information given.  Staff will give DASH initial information.  Call us especially if no improvement in 2 weeks with increase amlodipine.  -follow-up with Dr. Alexander as scheduled in September 2024    Recommend low sodium diet, daily exercise and maintain a normal BMI. Recommend monitor blood pressure at home.  Medications Ordered  1.meclizine 25 mg tablet, Take 1 tablet orally 2 times a day as needed.  Future appointments  1.Referral Visit - Robbie Sanchez ([email or web address]) :  (Today)    Miscellaneous  1.Reviewed nuclear pet, myocardial perfusion imaging, renal vascular ultrasound with the patient.  2.Weight monitoring (regime/therapy)  Nurses documentation  Triage - Nursing Doc  Upcoming surgeries: none  Use of assistive devices(s): none  Refills: none  EKG: none  Triage & medication list reviewed by: FRANK GALLARDO    Patient instructions  Plan:  -will give meclizine 25 mg can take up to two times a day for dizziness #30 tablets with no refill  -need to see PCP within a week or further evaluation at immediate care or ER for vertigo symptoms and hoarse voice after vomiting  -keep enrollment appointment with remote patient monitoring for hypertension on 7/18/24  -continue aspirin 81 mg daily  -go back to amlodipine 2.5 mg to twice a day   -continue Toprol XL 50 mg in the morning (limited in increasing due to low heart rate)  -continue losartan 50 mg in the afternoon  -continue atorvastatin 40 mg nightly  -Monitor blood pressure and call if systolic blood pressure greater than 130 mmHg consistently and/or diastolic blood pressure greater than 85 mmHg. DASH diet and blood pressure monitoring information given.  Staff will give DASH initial information.  Call us especially if no improvement in 2 weeks with increase amlodipine. [phone]  -follow-up with Dr. Alexander as scheduled in September 2024    Lab Details  COMP METABOLIC PANEL (14)  07/12/2024 12:28:38 PM  GLUCOSE 381 70-99 mg/dL H F  SODIUM 136 136-145 mmol/L  F  POTASSIUM 4.4 3.5-5.1 mmol/L  F  CHLORIDE 103  mmol/L  F  CO2 28.0 21.0-32.0 mmol/L  F  ANION GAP 5 0-18 mmol/L  F  BUN 25 9-23 mg/dL H F  CREATININE 1.17 0.70-1.30 mg/dL  F  CALCIUM 9.4 8.5-10.1 mg/dL  F  OSMOLALITY CALCULATED 302 275-295 mOsm/kg H F  E GFR CR 65 >=60 mL/min/1.73m2  F  AST 16 15-37 U/L  F  ALT 36 16-61 U/L  F  ALKALINE PHOSPHATASE 82  U/L  F  BILIRUBIN, TOTAL 0.3 0.1-2.0 mg/dL  F  TOTAL PROTEIN 7.9 6.4-8.2 g/dL  F  ALBUMIN 3.9 3.4-5.0  g/dL  F  GLOBULIN 4.0 2.8-4.4 g/dL  F  A/G RATIO 1.0 1.0-2.0  F  FASTING PATIENT CMP ANSWER Yes   F  LIPID PANEL  07/12/2024 12:28:38 PM  CHOLESTEROL 186 <200 mg/dL  F  HDL CHOL 55 40-59 mg/dL  F  TRIGLYCERIDES 202  mg/dL H F  LDL CHOLESTROL 97 <100 mg/dL  F  VLDL 33 0-30 mg/dL H F  NON HDL CHOL 131 <130 mg/dL H F  FASTING PATIENT LIPID ANSWER Yes   F    Diagnostics Details  Nuclear PET 06/26/2024  1.Stress EKG is normal.  1.This is an abnormal perfusion study. Study is consistent with mostly scar tissue with minimal ischemia.  2.Large partially reversible perfusion abnormality of moderate intensity in the inferior region.  3.Perfusion imaging is suggestive of single vessel disease. Perfusion defect is in the distribution of right coronary artery.  4.The left ventricular cavity is noted to be normal on the stress studies. The stress left ventricular ejection fraction was calculated to be 59% and left ventricular global function is normal. The rest left ventricular cavity is noted to be normal. The rest left ventricular ejection fraction was calculated to be 59% and rest left ventricular global function is normal.  5.Persistent hypokinesis of the inferior region is noted in both rest and stress studies. Compared with rest and stress studies the ejection fraction remains unchanged (59).  6.This scan is suggestive of low risk for future cardiovascular events.  7.The study quality is excellent.    Exercise Myocardial Perfusion Imaging 11/09/2021  1.Stress EKG is normal.  2.The heart rate recovery is normal.  1.This is an abnormal perfusion study. Study is consistent with prior infarction.  2.This scan is suggestive of moderate risk for future cardiovascular events.  3.Medium fixed perfusion abnormality of severe intensity in the inferior region.  4.The left ventricular cavity is noted to be normal on the stress study. The left ventricular ejection fraction was calculated to be 43% and left ventricular global  function is mildly reduced.  5.The study quality is good.    Renal Vascular Ultrasound 11/02/2021  1.The study quality is good.  2.No evidence of significant renal artery stenosis in the visualized bilateral renal arteries.  3.No evidence of abdominal aorta aneurysm or ectasia.  4.Kidney resistive indices are within normal limits bilaterally.  5.Renal veins are patent bilaterally.    Trans Thoracic Echocardiogram 11/02/2021  1.The study quality is average.  2.The left ventricle is normal in size. Global left ventricular systolic function is normal. The left ventricular ejection fraction is 58%. The left ventricle diastolic function is impaired (Grade I) with normal left atrial pressure. Mild concentric left ventricular hypertrophy is present. No regional wall motion abnormality.  3.The right ventricle is normal in size. Right ventricular systolic function is normal.  4.The left atrial diameter is mildly increased.  5.Ascending aorta diameter is 4.0 cms. Aortic root diameter is 3.9 cms.  6.No significant valvular regurgitation or stenosis.    CPOE Orders carried out by: Tonny Wiggins MD  Care Providers: Dewayne Alexander MD, Jodee Greenfield and Janine Faria  Electronically Authenticated by  Dewayne Alexander MD  07/16/2024 02:35:55 PM  Disclaimer: Components of this note were documented using voice recognition system and are subject to errors not corrected at proofreading. Contact the author of this note for any clarifications.

## 2024-07-16 NOTE — CONSULTS
[facility]  Cardiology Consultation    Tahir Kapoor Patient Status:  Inpatient    4/15/1950 MRN [id number]   Location [facility] 3NE-A Attending Joesph Guillen MD   Hosp Day # 1 PCP No primary care provider on file.     Reason for Consultation:  CVA, CAD hx PCI in RCA, HTN    History of Present Illness:  Tahir Kapoor is a a(n) 74 year old male who presents with unsteady gait and facial droop.  He has followed with myself recently and office note has been copied into current EMR chart for reference. Recently I had noted elevated blood pressure and increased his amlodipine dose.  He reported the following day nausea/vomiting and unsteadiness.  He was evaluated by me and we lowered the amlodipine back down.  Wife noted left facial droop and thus came to ER for further evaluation.   He denies chest pain, dyspnea, orthopnea, PND or LE swelling.  He denies palpitations or syncope.  He denies fever, chills, diarrhea or urinary complaints.      History:  Past Medical History:    CAD (coronary artery disease)    Coronary atherosclerosis    Heart attack (HCC)    Hypertension     Past Surgical History:   Procedure Laterality Date    Cath bare metal stent (bms)      Total hip replacement       History reviewed. No pertinent family history.   reports that he quit smoking about 10 years ago. His smoking use included cigarettes. He does not have any smokeless tobacco history on file. He reports current alcohol use.    Allergies:  No Known Allergies    Medications:    Current Facility-Administered Medications:     insulin degludec (Tresiba) 100 units/mL flextouch 18 Units, 18 Units, Subcutaneous, Nightly    sodium chloride 0.9% infusion, , Intravenous, Continuous    labetalol (Trandate) 5 mg/mL injection 10 mg, 10 mg, Intravenous, Q10 Min PRN    hydrALAzine (Apresoline) 20 mg/mL injection 10 mg, 10 mg, Intravenous, Q2H PRN    ondansetron (Zofran) 4 MG/2ML injection 4 mg, 4 mg, Intravenous, Q6H  PRN    metoclopramide (Reglan) 5 mg/mL injection 10 mg, 10 mg, Intravenous, Q8H PRN    aspirin 300 MG rectal suppository 300 mg, 300 mg, Rectal, Daily **OR** aspirin tab 325 mg, 325 mg, Oral, Daily    enoxaparin (Lovenox) 40 MG/0.4ML SUBQ injection 40 mg, 40 mg, Subcutaneous, Daily    acetaminophen (Tylenol Extra Strength) tab 1,000 mg, 1,000 mg, Oral, Q8H PRN    melatonin tab 3 mg, 3 mg, Oral, Nightly PRN    polyethylene glycol (PEG 3350) (Miralax) 17 g oral packet 17 g, 17 g, Oral, Daily PRN    sennosides (Senokot) tab 17.2 mg, 17.2 mg, Oral, Nightly PRN    bisacodyl (Dulcolax) 10 MG rectal suppository 10 mg, 10 mg, Rectal, Daily PRN    fleet enema (Fleet) 7-19 GM/118ML rectal enema 133 mL, 1 enema, Rectal, Once PRN    benzonatate (Tessalon) cap 200 mg, 200 mg, Oral, TID PRN    glycerin-hypromellose- (Artificial Tears) 0.2-0.2-1 % ophthalmic solution 1 drop, 1 drop, Both Eyes, QID PRN    sodium chloride (Saline Mist) 0.65 % nasal solution 1 spray, 1 spray, Each Nare, Q3H PRN    hydrALAzine (Apresoline) 20 mg/mL injection 5 mg, 5 mg, Intravenous, Q6H PRN    glucose (Dex4) 15 GM/59ML oral liquid 15 g, 15 g, Oral, Q15 Min PRN **OR** glucose (Glutose) 40% oral gel 15 g, 15 g, Oral, Q15 Min PRN **OR** glucose-vitamin C (Dex-4) chewable tab 4 tablet, 4 tablet, Oral, Q15 Min PRN **OR** dextrose 50% injection 50 mL, 50 mL, Intravenous, Q15 Min PRN **OR** glucose (Dex4) 15 GM/59ML oral liquid 30 g, 30 g, Oral, Q15 Min PRN **OR** glucose (Glutose) 40% oral gel 30 g, 30 g, Oral, Q15 Min PRN **OR** glucose-vitamin C (Dex-4) chewable tab 8 tablet, 8 tablet, Oral, Q15 Min PRN    insulin aspart (NovoLOG) 100 Units/mL FlexPen 1-68 Units, 1-68 Units, Subcutaneous, TID CC    amLODIPine (Norvasc) tab 2.5 mg, 2.5 mg, Oral, BID    atorvastatin (Lipitor) tab 40 mg, 40 mg, Oral, Nightly    losartan (Cozaar) tab 50 mg, 50 mg, Oral, Daily    metoprolol succinate ER (Toprol XL) 24 hr tab 50 mg, 50 mg, Oral, Daily Beta Blocker     insulin aspart (NovoLOG) 100 Units/mL FlexPen 1-68 Units, 1-68 Units, Subcutaneous, TID AC&HS    Review of Systems:  A comprehensive review of systems was negative if not otherwise mention in above HPI.    /89 (BP Location: Right arm)   Pulse 64   Temp 98.4 °F (36.9 °C) (Oral)   Resp 18   Ht 5' 8\" (1.727 m)   Wt 197 lb 5 oz (89.5 kg)   SpO2 95%   BMI 30.00 kg/m²   Temp (24hrs), Av.4 °F (36.9 °C), Min:97.9 °F (36.6 °C), Max:98.6 °F (37 °C)       Intake/Output Summary (Last 24 hours) at 2024 1512  Last data filed at 7/15/2024 2318  Gross per 24 hour   Intake 500 ml   Output 1 ml   Net 499 ml     Wt Readings from Last 3 Encounters:   07/15/24 197 lb 5 oz (89.5 kg)   13 202 lb 13.2 oz (92 kg)       Physical Exam:   General: Alert and oriented x 3. No apparent distress. No respiratory or constitutional distress.  HEENT: Normocephalic, anicteric sclera, neck supple.  Neck: No JVD, carotids 2+, no bruits.  Cardiac: Regular rate and rhythm. S1, S2 normal. No murmur, pericardial rub, S3.  Lungs: Clear without wheezes, rales, rhonchi or dullness.  Normal excursions and effort.  Abdomen: Soft, non-tender.  Extremities: Without clubbing, cyanosis or edema.  Peripheral pulses are 2+.  Neurologic: Alert and oriented, normal affect.  Skin: Warm and dry.     Laboratory Data:  Lab Results   Component Value Date    WBC 9.5 07/15/2024    HGB 14.2 07/15/2024    HCT 39.2 07/15/2024    .0 07/15/2024    CREATSERUM 1.03 07/15/2024    BUN 26 07/15/2024     07/15/2024    K 4.2 07/15/2024     07/15/2024    CO2 26.0 07/15/2024     07/15/2024    CA 9.5 07/15/2024    ALB 3.6 07/15/2024    ALKPHO 87 07/15/2024    BILT 0.6 07/15/2024    TP 7.9 07/15/2024    AST 17 07/15/2024    ALT 30 07/15/2024    PTT 26.8 07/15/2024    INR 0.90 07/15/2024    PTP 12.1 07/15/2024    PGLU 307 2024      Latest Reference Range & Units 24 00:27   Cholesterol, Total <200 mg/dL 140   Triglycerides 30 -  149 mg/dL 207 (H)   HDL Cholesterol 40 - 59 mg/dL 43   VLDL 0 - 30 mg/dL 31 (H)   NON-HDL CHOLESTEROL <130 mg/dL 97   LDL Cholesterol Calc <100 mg/dL 63   (H): Data is abnormally high    Imaging:  EKG 7/16/2024: sinus with q wave inferior consistent with old inferior MI with no major ST/T wave changes    Echo 7/16/2024: Conclusions:   1. Left ventricle: The cavity size was normal. Wall thickness was mildly increased. Systolic function was at the lower limits of normal. The estimated ejection fraction was 50%, by visual assessment. Left ventricular diastolic function parameters were normal for the patient's age.   2. Left ventricle: There is severe hypokinesis of the basal inferior and basal-mid inferolateral walls. There is hypokinesis of the basal anterolateral wall.   3. Left atrium: The left atrial volume was mildly to moderately increased.   4. Atrial septum: Agitated saline contrast study showed no atrial level shunt, at baseline or with provocation.   5. Aortic valve: There was thickening, consistent with sclerosis.   6. Aortic root: The aortic root was 3.9cm diameter.   7. Ascending aorta: The ascending aorta was 3.8cm diameter.   8. Pulmonary arteries: Systolic pressure was within the normal range, estimated to be 30mm Hg.     Nuclear PET 06/26/2024  1.Stress EKG is normal.  1.This is an abnormal perfusion study. Study is consistent with mostly scar tissue with minimal ischemia.  2.Large partially reversible perfusion abnormality of moderate intensity in the inferior region.  3.Perfusion imaging is suggestive of single vessel disease. Perfusion defect is in the distribution of right coronary artery.  4.The left ventricular cavity is noted to be normal on the stress studies. The stress left ventricular ejection fraction was calculated to be 59% and left ventricular global function is normal. The rest left ventricular cavity is noted to be normal. The rest left ventricular ejection fraction was calculated to  be 59% and rest left ventricular global function is normal.  5.Persistent hypokinesis of the inferior region is noted in both rest and stress studies. Compared with rest and stress studies the ejection fraction remains unchanged (59).  6.This scan is suggestive of low risk for future cardiovascular events.  7.The study quality is excellent.     Exercise Myocardial Perfusion Imaging 11/09/2021  1.Stress EKG is normal.  2.The heart rate recovery is normal.  1.This is an abnormal perfusion study. Study is consistent with prior infarction.  2.This scan is suggestive of moderate risk for future cardiovascular events.  3.Medium fixed perfusion abnormality of severe intensity in the inferior region.  4.The left ventricular cavity is noted to be normal on the stress study. The left ventricular ejection fraction was calculated to be 43% and left ventricular global function is mildly reduced.  5.The study quality is good.     Renal Vascular Ultrasound 11/02/2021  1.The study quality is good.  2.No evidence of significant renal artery stenosis in the visualized bilateral renal arteries.  3.No evidence of abdominal aorta aneurysm or ectasia.  4.Kidney resistive indices are within normal limits bilaterally.  5.Renal veins are patent bilaterally.    Impression:  CVA  CAD hx PCI in RCA  HTN  DM Type 2  Hyperlipidemia    Recommendations:  -tele monitoring  -echo showing signs of prior MI in RCA territory and recent stress in June 2024 with signs of infarct only  -blood pressure controlled on current regimen of amlodipine 2.5 mg twice a day, Toprol XL 50 mg daily and losartan 50 mg daily (limited in increasing dose of Toprol due to bradycardia)  -lipids are good on atorvastatin 40 mg nightly (LDL 63)  -anti-platelet recommendations per neurology service  -if neurology service concerned about possible embolic cause than would recommend LINQ implant prior to discharge (reviewed with patient and wife at bedside and agreeable if a  concern by neurology service)  -had recommended enrollment in remote patient monitoring for hypertension and likely will need to have re-schedule upon hospital discharge follow-up in [facility]    Thank you for allowing me to participate in the care of your patient.    Dewayne Alexander MD  7/16/2024  3:12 PM

## 2024-07-16 NOTE — PHYSICAL THERAPY NOTE
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  Prior to admission, patient's baseline is independent without assistive device.  Patient is requiring contact guard assist as a result of the following impairments: impaired dynamic standing balance and impaired motor planning.  Physical Therapy will continue to follow for duration of hospitalization.    Patient will benefit from continued skilled PT Services at discharge to promote prior level of function.  Anticipate patient will return home with OP PT.    PLAN  PT Treatment Plan: Bed mobility;Body mechanics;Coordination;Patient education;Gait training;Stoop training;Transfer training;Balance training;Strengthening  Rehab Potential : Good  Frequency (Obs): 3-5x/week  Number of Visits to Meet Established Goals: 3      CURRENT GOALS    Goal #1 Patient is able to demonstrate supine - sit EOB @ level: independent     Goal #2 Patient is able to demonstrate transfers EOB to/from Chair/Wheelchair at assistance level: modified independent     Goal #3 Patient is able to ambulate 250 feet with assist device: walker - rolling at assistance level: supervision     Goal #4    Goal #5    Goal #6    Goal Comments: Goals established on 7/16/2024      PHYSICAL THERAPY MEDICAL/SOCIAL HISTORY  History related to current admission: Patient is a 74 year old male admitted on 7/15/2024 from home for facial droop, fall, gait instability/dizzness over last week.  Pt diagnosed with acute/subacute infarcts of L  cerebellar hemipshere and Left posterior medulla per brain MRI. Pt awaiting neuro consult, \"no need for permissive HTN\" per hospitalist note.    MRI BRAIN 7/15/24:  Impression  CONCLUSION:  Multifocal punctate acute-subacute infarcts involving the left cerebellar hemisphere and left posterior medulla.      HOME SITUATION  Type of [facility]: [facility]   Home Layout: Two level;Able to live on main level  Stairs to Enter : 2  Railing: Yes  Stairs to Bedroom: 0       Lives With: Spouse  Drives: Yes  Patient Owned Equipment: Rolling walker;Cane (spouse's equipment, however not currenlty using and pt can use)  Patient Regularly Uses: Glasses    Prior Level of [place]: Pt typically ind with all self care and mobility. Denies other falls in last 6 months with exception of fall in last few days. Pt typically drives and is a retired . Spouse and dtr present during eval.    SUBJECTIVE  \"I feel better after that\" referring to ambulating in bolaños.      OBJECTIVE  Precautions: Bed/chair alarm  Fall Risk: High fall risk    WEIGHT BEARING RESTRICTION  Weight Bearing Restriction: None                PAIN ASSESSMENT  Ratin          COGNITION  Overall Cognitive Status:  WFL - within functional limits    RANGE OF MOTION AND STRENGTH ASSESSMENT  Upper extremity ROM and strength: see OT note    Lower extremity ROM is within functional limits     Lower extremity strength is within functional limits , grossly 5/5 throughout on MMT      BALANCE  Static Sitting: Normal  Dynamic Sitting: Good  Static Standing: Fair  Dynamic Standing: Fair -    ADDITIONAL TESTS                                    ACTIVITY TOLERANCE  Pulse: 64  Heart Rate Source: Monitor                   O2 WALK  Oxygen Therapy  SPO2% on Room Air at Rest: 94    NEUROLOGICAL FINDINGS  Neurological Findings: Sensation           Sensation: intact to light touch B LE, no c/o numbness/tingling B LE         AM-PAC '6-Clicks' INPATIENT SHORT FORM - BASIC MOBILITY  How much  difficulty does the patient currently have...  Patient Difficulty: Turning over in bed (including adjusting bedclothes, sheets and blankets)?: None   Patient Difficulty: Sitting down on and standing up from a chair with arms (e.g., wheelchair, bedside commode, etc.): None   Patient Difficulty: Moving from lying on back to sitting on the side of the bed?: None   How much help from another person does the patient currently need...   Help from Another: Moving to and from a bed to a chair (including a wheelchair)?: A Little   Help from Another: Need to walk in hospital room?: A Little   Help from Another: Climbing 3-5 steps with a railing?: A Little       AM-PAC Score:  Raw Score: 21   Approx Degree of Impairment: 28.97%   Standardized Score (AM-PAC Scale): 50.25   CMS Modifier (G-Code): CJ    FUNCTIONAL ABILITY STATUS  Gait Assessment   Functional Mobility/Gait Assessment  Gait Assistance: Contact guard assist  Distance (ft): 200  Assistive Device: Rolling walker  Pattern: L Decreased stance time;Ataxic (Narrow base of support with increasing left lean with distance, able to correct temporarily with cueing)    Skilled Therapy Provided     Bed Mobility:  Rolling: NT  Supine to sit: mod I   Sit to supine: NT     Transfer Mobility:  Sit to stand: cga   Stand to sit: cga  Gait = cga with cueing to increase base of support, look ahead.    Therapist's Comments: Pt presents supine in bed, agreeable to PT. Requesting to use bathroom. Pt mobility as above. Pt with mild c/o dizziness upon sitting and during standing. BP not initially taken as pt requesting to use toilet. Pt ambulates to bathroom with walker and close cga, cues for walker mgmt and use in small bathroom. Pt able to complete roberto-care without assist. Pt able to stand at sink to wash hands with cga and cueing again for walker mgmt. Gait training in b[place] with cues for base of support, gaze. Pt able to correct but requires increased attention and then repeated cueing  to maintain. Trial sans walker with increased sway and instability.  Currently recommend r/w at all times for safety and balance. Pt verbalizes understanding. Pt encouraged to be up to chair for meals and ambulate with staff in halls 3 x day. Pt verbalizes understanding. Pt educated on 'call, don't fall.\" Pt left up in chair with chair alarm activated and RN present. Discussed OP PT and pt currently agreeable. Order placed in EPIC and hard copy provided to pt and spouse. Will benefit from continued gait training and stair training next session.     Exercise/Education Provided:  Bed mobility  Functional activity tolerated  Gait training  Posture  Transfer training    Patient End of Session: Up in chair;With  staff;Needs met;Call light within reach;RN aware of session/findings;All patient questions and concerns addressed;Alarm set;Family present      Patient Evaluation Complexity Level:  History Moderate - 1 or 2 personal factors and/or co-morbidities   Examination of body systems Moderate - addressing a total of 3 or more elements   Clinical Presentation Low - Stable   Clinical Decision Making Low - Stable       PT Session Time: 25 minutes  Gait Trainin minutes  Therapeutic Activity: 8 minutes

## 2024-07-16 NOTE — PLAN OF CARE
[facility] care 0730.  Alert and oriented x4.  Neuro q4. NIH daily.  Left sided weakness.  Aspiration precautions.   SLP cleared pt.   PT/OT to see.  Neuro to see.   Accu checks  Cardiac diet  Echo completed  Lovenox for VTE prophylaxis.   Denies pain   Pills whole with water  Continue to monitor pt.   Cardiology consulted.   Diabetic educator consulted.     Problem: SAFETY ADULT - FALL  Goal: Free from fall injury  Description: INTERVENTIONS:  - Assess pt frequently for physical needs  - Identify cognitive and physical deficits and behaviors that affect risk of falls.  - [place] fall precautions as indicated by assessment.  - Educate pt/family on patient safety including physical limitations  - Instruct pt to call for assistance with activity based on assessment  - Modify environment to reduce risk of injury  - Provide assistive devices as appropriate  - Consider OT/PT consult to assist with strengthening/mobility  - Encourage toileting schedule  Outcome: Progressing     Problem: DISCHARGE PLANNING  Goal: Discharge to home or other facility with appropriate resources  Description: INTERVENTIONS:  - Identify barriers to discharge w/pt and caregiver  - Include patient/family/discharge partner in discharge planning  - Arrange for needed discharge resources and transportation as appropriate  - Identify discharge learning needs (meds, wound care, etc)  - Arrange for interpreters to assist at discharge as needed  - Consider post-discharge preferences of patient/family/discharge partner  - Complete POLST form as appropriate  - Assess patient's ability to be responsible for managing their own health  - Refer to Case Management Department for coordinating discharge planning if the patient needs post-hospital services based on physician/LIP order or complex needs related to functional status, cognitive ability or social support system  Outcome: Progressing     Problem: MUSCULOSKELETAL - ADULT  Goal: Return mobility to safest  level of function  Description: INTERVENTIONS:  - Assess patient stability and activity tolerance for standing, transferring and ambulating w/ or w/o assistive devices  - Assist with transfers and ambulation using safe patient handling equipment as needed  - Ensure adequate protection for wounds/incisions during mobilization  - Obtain PT/OT consults as needed  - Advance activity as appropriate  - Communicate ordered activity level and limitations with patient/family  Outcome: Progressing

## 2024-07-16 NOTE — PROGRESS NOTES
Stroke booklet given to patient; the following education was provided:     What is stroke  BEFAST - Stroke warning sings and symptoms  How to initiate EMS  Secondary stroke prevention and personalized risk factors: HTN, CAD, MI, obesity  Lifestyle modifications (nutrition and exercise)  Post-stroke recovery and follow-up  Community resources (stroke support group and non-emergent stroke line)    Spouse present during education, patient receptive to teachings. All pertinent questions and concerns were addressed.      RN Stroke Navigator team  [phone]

## 2024-07-16 NOTE — PROGRESS NOTES
NURSING ADMISSION NOTE      Patient admitted via Cart  Oriented to room.  Safety precautions initiated.  Bed in low position.  Call light in reach.    Admission navigator completed with patient and spouse by this RN, A/Pedro4, on room air, NSR on tele. VTE prophylaxis with lovenox. Passed RN dysphagia screen. Cardiac diet, accuchecks QID. Q2 neuro assessments until 0800 then Q4. No complaints of pain. Neurology consulted. PT, OT, SLP to see. PIV infusing 0.9 nacl at 75 ml/hr. Patient updated with plan of care. All needs met at this time,

## 2024-07-16 NOTE — PLAN OF CARE
Problem: Patient/Family Goals  Goal: Patient/Family Long Term Goal  Description: Patient's Long Term Goal: discharge    Interventions:  - PT/OT/SLP  - See additional Care Plan goals for specific interventions  Outcome: Progressing  Goal: Patient/Family Short Term Goal  Description: Patient's Short Term Goal: regain strength/steadiness with walking    Interventions:   - Neurology to see  - echo   - PT/OT   - See additional Care Plan goals for specific interventions  Outcome: Progressing     Problem: SAFETY ADULT - FALL  Goal: Free from fall injury  Description: INTERVENTIONS:  - Assess pt frequently for physical needs  - Identify cognitive and physical deficits and behaviors that affect risk of falls.  - [place] fall precautions as indicated by assessment.  - Educate pt/family on patient safety including physical limitations  - Instruct pt to call for assistance with activity based on assessment  - Modify environment to reduce risk of injury  - Provide assistive devices as appropriate  - Consider OT/PT consult to assist with strengthening/mobility  - Encourage toileting schedule  Outcome: Progressing     Problem: DISCHARGE PLANNING  Goal: Discharge to home or other facility with appropriate resources  Description: INTERVENTIONS:  - Identify barriers to discharge w/pt and caregiver  - Include patient/family/discharge partner in discharge planning  - Arrange for needed discharge resources and transportation as appropriate  - Identify discharge learning needs (meds, wound care, etc)  - Arrange for interpreters to assist at discharge as needed  - Consider post-discharge preferences of patient/family/discharge partner  - Complete POLST form as appropriate  - Assess patient's ability to be responsible for managing their own health  - Refer to Case Management Department for coordinating discharge planning if the patient needs post-hospital services based on physician/LIP order or complex needs related to functional  status, cognitive ability or social support system  Outcome: Progressing     Problem: MUSCULOSKELETAL - ADULT  Goal: Return mobility to safest level of function  Description: INTERVENTIONS:  - Assess patient stability and activity tolerance for standing, transferring and ambulating w/ or w/o assistive devices  - Assist with transfers and ambulation using safe patient handling equipment as needed  - Ensure adequate protection for wounds/incisions during mobilization  - Obtain PT/OT consults as needed  - Advance activity as appropriate  - Communicate ordered activity level and limitations with patient/family  Outcome: Progressing     Problem: NEUROLOGICAL - ADULT  Goal: Achieves stable or improved neurological status  Description: INTERVENTIONS  - Assess for and report changes in neurological status  - Initiate measures to prevent increased intracranial pressure  - Maintain blood pressure and fluid volume within ordered parameters to optimize cerebral perfusion and minimize risk of hemorrhage  - Monitor temperature, glucose, and sodium. Initiate appropriate interventions as ordered  Outcome: Progressing

## 2024-07-16 NOTE — CONSULTS
[facility]  Diabetes Clinical Nurse Specialist Consult Note    Tahir Kapoor Patient Status:  Inpatient    4/15/1950 MRN [id number]   Location [facility] 3NE-A Attending Joesph Guillen MD   Hosp Day # 1 PCP No primary care provider on file.     Reason for Consult:   New onset DM2, s/p TIA    Diagnosis:    Patient Active Problem List   Diagnosis    Facial droop    Hypertension    Diabetes mellitus (HCC)    Gait instability     Labs:    Recent Labs   Lab 07/15/24  1528 07/15/24  1915 07/15/24  2350 24  0532 24  1146   PGLU 362* 263* 296* 261* 307*     Recent Labs   Lab 24  1050 07/15/24  1537   CREATSERUM 1.17 1.03     HGBA1C:    Lab Results   Component Value Date    A1C 10.3 (H) 07/15/2024     (H) 07/15/2024       Lab Results   Component Value Date    WBC 9.5 07/15/2024    HGB 14.2 07/15/2024    HCT 39.2 07/15/2024    .0 07/15/2024    CREATSERUM 1.03 07/15/2024    BUN 26 (H) 07/15/2024     07/15/2024    K 4.2 07/15/2024     07/15/2024    CO2 26.0 07/15/2024     (H) 07/15/2024    CA 9.5 07/15/2024    ALB 3.6 07/15/2024    ALKPHO 87 07/15/2024    BILT 0.6 07/15/2024    TP 7.9 07/15/2024    AST 17 07/15/2024    ALT 30 07/15/2024    PTT 26.8 07/15/2024    INR 0.90 07/15/2024    TSH 1.640 10/26/2021       Discussion:  Pt is 74 YOM who presented to the ED on 7/15/2024 after taking amlodipine ( a double dose) and took a nap, after nap fel \"drunk as a skunk\" and off balance. Thisi s a new diagnosis of DM2.. Serum glucose was 343.  Glucoses from 2023 was 240, Pt does not have a PCP.     Pt had an echo today, Dr. Alexander at bedside. Neurology to be consulted.     Social: Patient is a (semi) retired , his is .     Reviewed the \"Understanding Diabetes\" book with them both, they are very engaged in learning. Reviewed target blood glucoses, A1C, pathophysiology of DM2, Hypo and hyperglycemia, signs and symptoms of hypoglycemia and treatment (Rule  of 15). Explained the role of carbohydrates in controlling blood glucose,ordered dietitian.     Pt was able to demonstrate insulin injection technique using demo pen and injection pad, and lancing finger using the Onetouch glucometer lancing device.     Also discussed the types of insulin and schedule for testing.     Pt discharge not until tomorrow at earliest.       I spent a total of 60 minutes with the patient, 100% of this encounter was spent counseling patient regarding diet, medications, side effects, treatment options, discharge planning.      Education Provided:    \"Understanding Diabetes\" booklet given  How to test blood glucose using glucometer  How to inject insulin using pen     Assessment/Plan:  Dx: DM2 new onset  Insulin per hospitalist  Onetouch Glucometer given to patient  Education as above  Dietitian consult for introduction to carbohydrate counting and meal planning  Discharge to [facility], needs to establish care with a PCP    Recommendations:    Patient to administer subcutaneously insulin injection with insulin pen under nursing supervision once return demonstration has verified patient's skill      PROVIDER F/U RECOMMENDATIONS:    [facility] and [facility]  Appt set up for patient at the [facility] in [place] with Wendy Marc RN CDE. 7/24/2024, 11AM.    Nancy Dickey, MSN, APRN, ACNS-BC, BC-ADM  Clinical Nurse Specialist  Diabetes Educator  7/16/2024  3:01 PM

## 2024-07-16 NOTE — OCCUPATIONAL THERAPY NOTE
OCCUPATIONAL THERAPY EVALUATION - INPATIENT    Room Number: 3614/3614-A  Evaluation Date: 7/16/2024     Type of Evaluation: Initial  Presenting Problem: Facial droop, hypertension, DM, gait instability    Physician Order: IP Consult to Occupational Therapy  Reason for Therapy:  ADL/IADL Dysfunction and Discharge Planning      OCCUPATIONAL THERAPY ASSESSMENT   Patient is a 74 year old male admitted on 7/15/2024 with Presenting Problem: Facial droop, hypertension, DM, gait instability. Co-Morbidities : DM, HTN, CAD  Patient is currently functioning near baseline with self-care and functional mobility.  Prior to admission, patient's baseline is independent.  Patient met all OT goals at mod I to supervision level with the use of a rolling walker.  Patient reports no further questions/concerns at this time.     Patient will benefit from continued skilled OT Services at discharge to promote prior level of function and safety with additional support and return home with OP OT      WEIGHT BEARING RESTRICTION  Weight Bearing Restriction: None                Recommendations for nursing staff:   Transfers: one person SBA and RW  Toileting location: Toilet    EVALUATION SESSION:  Patient at start of session: supine  FUNCTIONAL TRANSFER ASSESSMENT  Sit to Stand: Edge of Bed; Chair  Edge of Bed: Modified Independent  Chair: Modified Independent  Toilet Transfer: Modified Independent    BED MOBILITY  Rolling: Modified Independent  Supine to Sit : Modified Independent  Sit to Supine (OT): Modified Independent  Scooting: mod I    BALANCE ASSESSMENT  Static Sitting: Modified Independent  Sitting Bilateral: Modified Independent  Static Standing: Modified Independent  Standing Bilateral: Supervision    FUNCTIONAL ADL ASSESSMENT  Eating: Independent  Grooming Seated: Independent  LB Dressing Seated: Supervision  Toileting Seated: Supervision      ACTIVITY TOLERANCE:   Standing BP: 147/89                           O2 SATURATIONS   93%  RA    COGNITION  Attention Span:  appears intact  Orientation Level:  oriented x4  Memory:  appears intact  Following Commands:  follows one step commands without difficulty  Initiation: appears intact  Motor Planning: intact  Perseveration: not present  Safety Judgement:  good awareness of safety precautions  Awareness of Errors:  good awareness of errors made  Awareness of Deficits:  fully aware of deficits  COGNITION ASSESSMENTS  Clock Draw: 0      Upper Extremity:   ROM: within functional limits   Strength: is within functional limits   Coordination:  Gross motor: impaired LUE  Fine motor: impaired LUE  Sensation: Light touch:  intact  Proprioception:  intact  Stereognosis:  intact    EDUCATION PROVIDED  Patient: Role of Occupational Therapy; Plan of Care; Discharge Recommendations; Functional Transfer Techniques; Fall Prevention; Compensatory ADL Techniques  Patient's Response to Education: Verbalized Understanding; Returned Demonstration  Family/Caregiver: Role of Occupational Therapy; Discharge Recommendations; Plan of Care; Adaptive Equipment Recommendations  Family/Caregiver's Response to Education: Verbalized Understanding    Equipment used: RW  Demonstrates functional use    Therapist comments: OT educated patient on safety,  sequencing, energy conservation, pain management, home modifications and adaptive equipment to increase independence with ADLs.  Therapist led patient to complete PHQ9, pt scored 0 on PHQ9.  Scores demonstrate:  0-4 - Min risk of depression  5-9 - Mild risk of depression  10-14 - Moderate risk of depression  15-19 - Moderately severe risk of depression  20-27 - Severe risk of depression    Patient participated in clock drawing screen to examine perception and attention.  Observed WFL completion of clock.  Patient w/ noted left facial droop  Patient reports left leg length discrepancy        Patient End of Session: Up in chair;With  staff;Needs met;Call light within reach;RN aware  of session/findings;SCDs in place;All patient questions and concerns addressed;Alarm set;Family present;Discussed recommendations with /    OCCUPATIONAL PROFILE    HOME SITUATION  Type of Home: [facility]  Home Layout: Two level;Able to live on main level  Lives With: Spouse    Toilet and Equipment: Comfort height toilet;Standard height toilet;Grab bar  Shower/Tub and Equipment: Walk-in shower;Grab bar;Shower chair;Hand-held showerhead  Other Equipment:  (RW, cane)    Occupation/Status: retired   Hand Dominance: Right  Drives: Yes  Patient Regularly Uses: Glasses    Prior Level of Function: independent with ADLs and functional mobility without use of the use of adaptive device.  Patient's wife has equipment that she no longer uses, such as a rolling walker and patient reports he plans on using it during recovery period.  Patient reports one fall prior to this admit and no other falls in the past 6 months.  Wife and daughter are very supportive.    SUBJECTIVE  PAIN ASSESSMENT  Ratin  Location: denies       OBJECTIVE  Precautions: Bed/chair alarm  Fall Risk: High fall risk    WEIGHT BEARING RESTRICTION  Weight Bearing Restriction: None                AM-PAC ‘6-Clicks’ Inpatient Daily Activity Short Form  -   Putting on and taking off regular lower body clothing?: None  -   Bathing (including washing, rinsing, drying)?: None  -   Toileting, which includes using toilet, bedpan or urinal? : None  -   Putting on and taking off regular upper body clothing?: None  -   Taking care of personal grooming such as brushing teeth?: None  -   Eating meals?: None    AM-PAC Score:  Score: 24  Approx Degree of Impairment: 0%  Standardized Score (AM-PAC Scale): 57.54      ADDITIONAL TESTS  PHQ9: Yes    NEUROLOGICAL FINDINGS  Coordination - Finger to Nose: Symmetrical  Coordination - Rapid Alternating Movement: Symmetrical  Coordination - Finger Opposition: Symmetrical       PLAN   Patient has been  evaluated and presents with no skilled Occupational Therapy needs at this time.  Patient discharged from Occupational Therapy services.  Please re-order if a new functional limitation presents during this admission.      Patient Evaluation Complexity Level:   Occupational Profile/Medical History MODERATE - Expanded review of history including review of medical or therapy record   Specific performance deficits impacting engagement in ADL/IADL MODERATE  3 - 5 performance deficits   Client Assessment/Performance Deficits MODERATE - Comorbidities and min to mod modifications of tasks    Clinical Decision Making LOW - Analysis of occupational profile, problem-focused assessments, limited treatment options    Overall Complexity LOW     OT Session Time: 30 minutes  Self-Care Home Management: 15 minutes

## 2024-07-16 NOTE — H&P
[facility]     Tahir Kapoor Patient Status:  Emergency    4/15/1950 MRN [id number]   Location [facility] EMERGENCY DEPARTMENT Attending Roney Campa MD   Hosp Day # 0 PCP No primary care provider on file.     Chief Complaint: Nausea, weakness, dizzines     Subjective:    History of Present Illness:     Tahir Kapoor is a 74 year old male with  h/o CAD and hypertension. The gentleman reports that last week his norvasc dose was doubled to 5 BID form 2.5 BID. The next day he notes waking up from a nap with ongoing nausea, emesis, feeling very unsteady on his feet and dizzy. N, V finally subsided but pt continued to feel that his gait was unsteady and began to use his wifes' cane He states he seems to be dragging to the left. Pt notes he eventually needed her walker. Wife reports the left side of his mouth and eye seem uneven/ droopy. Pt had some difficulty with speech the first night ( last Tuesday) but eventually was able to put words together again. No vision changes.     History/Other:    Past Medical History:  Past Medical History:    CAD (coronary artery disease)    Hypertension     Past Surgical History:   History reviewed. No pertinent surgical history.   Family History:   No family history on file.  Social History:    reports that he quit smoking about 10 years ago. His smoking use included cigarettes. He does not have any smokeless tobacco history on file. He reports current alcohol use.     Allergies: No Known Allergies    Medications:    No current facility-administered medications on file prior to encounter.     Current Outpatient Medications on File Prior to Encounter   Medication Sig Dispense Refill    amLODIPine 2.5 MG Oral Tab Take 1 tablet (2.5 mg total) by mouth in the morning and 1 tablet (2.5 mg total) before bedtime.      losartan 50 MG Oral Tab Take 1 tablet (50 mg total) by mouth daily.      aspirin 81 MG Oral Tab Take 1 tablet (81 mg total) by  mouth daily.      Atorvastatin Calcium (LIPITOR) 80 MG Oral Tab Take 1 tablet (80 mg total) by mouth daily.      Metoprolol Tartrate (LOPRESSOR) 25 MG Oral Tab Take 25 mg by mouth daily.      lisinopril (PRINIVIL,ZESTRIL) 2.5 MG Oral Tab Take 2.5 mg by mouth daily.      Prasugrel HCl (EFFIENT) 10 MG Oral Tab Take 10 mg by mouth daily.      ibuprofen (MOTRIN) 200 MG Oral Tab Take 200 mg by mouth 2 (two) times daily.         Review of Systems:   A comprehensive review of systems was completed.    Pertinent positives and negatives noted in the HPI.    Objective:   Physical Exam:    /86   Pulse 58   Temp 98.4 °F (36.9 °C) (Oral)   Resp 21   Ht 5' 8\" (1.727 m)   Wt 180 lb (81.6 kg)   SpO2 96%   BMI 27.37 kg/m²   General: No acute distress, Alert  Respiratory: No rhonchi, no wheezes  Cardiovascular: S1, S2. Regular rate and rhythm  Abdomen: Soft, Non-tender, non-distended, positive bowel sounds  Neuro: No new focal deficits  Extremities: No edema      Results:    Labs:      Labs Last 24 Hours:    Recent Labs   Lab 07/15/24  1537   RBC 4.38   HGB 14.2   HCT 39.2   MCV 89.5   MCH 32.4   MCHC 36.2   RDW 12.3   NEPRELIM 5.42   WBC 9.5   .0       Recent Labs   Lab 07/12/24  1050 07/15/24  1537   * 343*   BUN 25* 26*   CREATSERUM 1.17 1.03   EGFRCR 65 76   CA 9.4 9.5   ALB 3.9 3.6    136   K 4.4 4.2    104   CO2 28.0 26.0   ALKPHO 82 87   AST 16 17   ALT 36 30   BILT 0.3 0.6   TP 7.9 7.9       Lab Results   Component Value Date    INR 0.90 07/15/2024       No results for input(s): \"TROP\", \"TROPHS\", \"CK\" in the last 168 hours.    No results for input(s): \"TROP\", \"PBNP\" in the last 168 hours.    No results for input(s): \"PCT\" in the last 168 hours.    Imaging: Imaging data reviewed in Epic.    Assessment & Plan:      #Gait imbalance, dizziness, N, V  Started almost 1 week ago- suspect CVA  CTA H&N no acute findings  MRI brain pending  Neuro on Cs  BP control, no need for permissive  HTN  PT/OT  #Hypertension  Continue norvasc at 2.5 BID dosing- pt recently increased to 5 BID and symptoms started next day   PRN hydralazine  Echo  #h/o CAD  #DM type 2   A1c 10.6%  IVF  ISS, ICF, Degludec 10 u at bedtime          Plan of care discussed with pt, pt spouse     Juli Bower MD    Supplementary Documentation:     The 21st Century Cures Act makes medical notes like these available to patients in the interest of transparency. Please be advised this is a medical document. Medical documents are intended to carry relevant information, facts as evident, and the clinical opinion of the practitioner. The medical note is intended as peer to peer communication and may appear blunt or direct. It is written in medical language and may contain abbreviations or verbiage that are unfamiliar.

## 2024-07-16 NOTE — ED PROVIDER NOTES
Patient signed out by outgoing physician pending admission.  Briefly patient presented with feeling off balance and dizzy for the past week.  CT CTA head negative.  Patient received MRI while reading in bed.  Received call from radiologist stating that MRI shows multi focal punctate acute/subacute infarcts in the left cerebellar hemisphere left posterior medulla.  Symptoms been ongoing for a week and patient is a candidate for any acute intervention.  Discussed with hospitalist Dr. Bower and neurology Dr. Trivedi placed on consult.        MRI BRAIN (CPT=70551)    Result Date: 7/15/2024  CONCLUSION:  Multifocal punctate acute-subacute infarcts involving the left cerebellar hemisphere and left posterior medulla.  COMMUNICATION:  The findings were communicated with Dr. Mahmood at 2205 on 7/15/2024. There was appropriate read back.   LOCATION:  [facility]   Dictated by (CST): Rangel Healy MD on 7/15/2024 at 10:00 PM     Finalized by (CST): Rangel Healy MD on 7/15/2024 at 10:06 PM       CTA BRAIN + CTA CAROTIDS (CPT=70496/38169)    Result Date: 7/15/2024  CONCLUSION:  No acute intracranial abnormality.  Patent intracranial and cervical arterial vasculature, without large vessel occlusion or aneurysm.    LOCATION:  [facility]   Dictated by (CST): Rangel Healy MD on 7/15/2024 at 5:51 PM     Finalized by (CST): Ragnel Healy MD on 7/15/2024 at 5:56 PM

## 2024-07-16 NOTE — SLP NOTE
ADULT SWALLOWING EVALUATION    ASSESSMENT    ASSESSMENT/OVERALL IMPRESSION:  Patient is a 75 yo man admitted to the hospital with left sided facial droop.  Patient with PMHx significant for CAD and hypertension. SLP ordered to evaluate per stroke protocol. Patient consumes a regular diet and thin liquids at baseline. Patient received alert in bed and tolerating RA. Oral mechanism exam was unremarkable. Patient with upper and lower dentures. Patient reported experiencing dysarthria and word finding deficits upon admission to the hospital, however reported these issues have resolved. Patient denied experiencing difficulties with memory or problem solving.     Patient presented with an intact oropharyngeal swallow. Bolus acceptance was adequate without evidence of anterior loss. Mastication and AP transit were thorough and efficient without evidence of oral residue. Pharyngeal swallow initiation appeared timely and hyolaryngeal excursion was adequate per palpation. No s/s aspiration observed. Patient denied globus sensation. He did report he is experiencing some mild odynophagia d/t irritation in his throat as a result of his emesis episode last night.     Patient received a score of 100 on the Bedside WAB. Expressive and receptive language appear intact. Spontaneous speech was absent of word finding difficulties as well as semantic and phonemic paraphasias. Patient adequately answered personal information questions and told a cohesive story about the events in a picture. Object naming and repetition were intact. Patient accurately answered yes/no questions and followed multi-step commands of increasing complexity.     Recommend continuing a regular diet and thin liquids. Recommend patient consumes all PO intake in an upright position. No further inpatient SLP services warranted as no deficits were identified which require skilled intervention. Education provided re: results and recommendations.           RECOMMENDATIONS   Diet Recommendations - Solids: Regular  Diet Recommendations - Liquids: Thin Liquids                           Aspiration Precautions: Upright position  Medication Administration Recommendations: No restrictions  Treatment Plan/Recommendations: No further inpatient SLP service warranted    HISTORY   MEDICAL HISTORY  Reason for Referral: Stroke protocol    Problem List  Principal Problem:    Facial droop  Active Problems:    Hypertension    Diabetes mellitus (HCC)    Gait instability      Past Medical History  Past Medical History:    CAD (coronary artery disease)    Coronary atherosclerosis    Heart attack (HCC)    Hypertension       Prior Living Situation: Home with spouse  Diet Prior to Admission: Regular;Thin liquids  Precautions: Aspiration    Patient/Family Goals: None stated.    SWALLOWING HISTORY  Current Diet Consistency: Regular;Thin liquids  Dysphagia History: As above.  Imaging Results:   MRI BRAIN 7/15/24  CONCLUSION:  Multifocal punctate acute-subacute infarcts involving the left cerebellar hemisphere and left posterior medulla.      COMMUNICATION:  The findings were communicated with Dr. Mahmood at 2205 on 7/15/2024. There was appropriate read back.         LOCATION:  [facility]         Dictated by (CST): Rangel Healy MD on 7/15/2024 at 10:00 PM       Finalized by (CST): Rangel Healy MD on 7/15/2024 at 10:06 PM     SUBJECTIVE       OBJECTIVE   ORAL MOTOR EXAMINATION  Dentition: Lower dentures;Upper dentures;Functional  Symmetry: Within Functional Limits  Strength: Within Functional Limits     Range of Motion: Within Functional Limits  Rate of Motion: Within Functional Limits    Voice Quality: Clear  Respiratory Status: Unlabored  Consistencies Trialed: Thin liquids;Hard solid  Method of Presentation: Self presentation  Patient Positioning: Upright;Midline    Oral Phase of Swallow: Within Functional Limits                      Pharyngeal Phase of Swallow: Within Functional Limits            (Please note: Silent aspiration cannot be evaluated clinically. Videofluoroscopic Swallow Study is required to rule-out silent aspiration.)    Esophageal Phase of Swallow: No complaints consistent with possible esophageal involvement                  FOLLOW UP  Treatment Plan/Recommendations: No further inpatient SLP service warranted     Follow Up Needed (Documentation Required): No       Thank you for your referral.   If you have any questions, please contact Paola OLSEN

## 2024-07-16 NOTE — DISCHARGE PLANNING
Patient follow-up appointment information:     Visit Type: Stroke follow-up  Date of TIA/Stroke: 07/15/2024  Type of Stroke: Ischemic  Patient to follow-up: 4 weeks  OP Neurologist: Any available neurologist  New patient to neurology service: Yes  Anticipated discharge (if known): TBD  Discharge disposition (if known): Home       RN Stroke Navigator team  [phone]

## 2024-07-16 NOTE — CM/SW NOTE
07/16/24 1400   CM/SW Referral Data   Referral Source Nurse   Reason for Referral Protocol order set   Specify order set Stroke   Informant EMR;Clinical Staff Member   Discharge Needs   Anticipated D/C needs No anticipated discharge needs     HOME SITUATION  Type of Home: [facility]   Home Layout: Two level;Able to live on main level  Stairs to Enter : 2  Railing: Yes  Stairs to Bedroom: 0     Lives With: Spouse  Drives: Yes  Patient Owned Equipment: Rolling walker;Cane (spouse's equipment, however not currenlty using and pt can use)  Patient Regularly Uses: Glasses     Prior Level of [place] per PT eval: Pt typically ind with all self care and mobility. Denies other falls in last 6 months with exception of fall in last few days. Pt typically drives and is a retired . Spouse and dtr present during eval.    Pt is a 75 y/o male admitted with facial droop. SW received stroke protocol order for HH eval. Chart reviewed and noted PT is anticipating pt will return home with outpatient therapy.     Anticipated therapy need: Home with Outpatient Rehab    No discharge needs identified at this time. SW will continue to remain available.     TANA Ball  Discharge Planner

## 2024-07-16 NOTE — PROGRESS NOTES
[facility]   part of [facility]     Hospitalist Progress Note     Tahir Kapoor Patient Status:  Inpatient    4/15/1950 MRN [id number]   Location [facility] 3NE-A Attending Joesph Guillen MD   Hosp Day # 1 PCP No primary care provider on file.     Chief Complaint: imbalance    Subjective:     Patient much better. Minimal imbalance, facial droop resolved    Objective:    Review of Systems:   A comprehensive review of systems was completed; pertinent positive and negatives stated in subjective.    Vital signs:  Temp:  [97.9 °F (36.6 °C)-98.6 °F (37 °C)] 98.4 °F (36.9 °C)  Pulse:  [53-72] 64  Resp:  [14-21] 18  BP: (125-196)/(71-98) 147/89  SpO2:  [93 %-99 %] 95 %    Physical Exam:    General: No acute distress  Respiratory: No wheezes, no rhonchi  Cardiovascular: S1, S2, regular rate and rhythm  Abdomen: Soft, Non-tender, non-distended, positive bowel sounds  Neuro: No new focal deficits.   Extremities: No edema      Diagnostic Data:    Labs:  Recent Labs   Lab 07/15/24  1537   WBC 9.5   HGB 14.2   MCV 89.5   .0   INR 0.90       Recent Labs   Lab 24  1050 07/15/24  1537   * 343*   BUN 25* 26*   CREATSERUM 1.17 1.03   CA 9.4 9.5   ALB 3.9 3.6    136   K 4.4 4.2    104   CO2 28.0 26.0   ALKPHO 82 87   AST 16 17   ALT 36 30   BILT 0.3 0.6   TP 7.9 7.9       Estimated Creatinine Clearance: 60 mL/min (based on SCr of 1.03 mg/dL).    No results for input(s): \"TROP\", \"TROPHS\", \"CK\" in the last 168 hours.    Recent Labs   Lab 07/15/24  1537   PTP 12.1   INR 0.90                  Microbiology    No results found for this visit on 07/15/24.      Imaging: Reviewed in Epic.    Medications:    insulin degludec  18 Units Subcutaneous Nightly    aspirin  300 mg Rectal Daily    Or    aspirin  325 mg Oral Daily    enoxaparin  40 mg Subcutaneous Daily    insulin aspart  1-68 Units Subcutaneous TID CC    amLODIPine  2.5 mg Oral BID    atorvastatin  40 mg Oral Nightly    losartan  50  mg Oral Daily    metoprolol succinate  50 mg Oral Daily Beta Blocker    insulin aspart  1-68 Units Subcutaneous TID AC&HS       Assessment & Plan:    #Gait imbalance, dizziness, N, V  #acute and sub acute CVAs, multiple  CTA H&N no acute findings  MRI brain noted  Neuro on Cs. D/w neuro. ASA and plavix x 21 d  PT/OT    #severe hypokinesis on echo  -consult cards  -had stress last month  -cards recommending linq prior to DC. Defer to cards and neuro     #Hypertension  Cont bp meds  PRN hydralazine    #h/o CAD    #new onset DM type 2   A1c 10.6%  IVF  Reviewed in detail  DM APN, nutrition  Increase degludec  Home w/ insulin    Likely home tomorrow after gets DM education and p ossible LINQ      Joesph Guillen MD    Supplementary Documentation:     Quality:  DVT Mechanical Prophylaxis:   SCDs,    DVT Pharmacologic Prophylaxis   Medication    enoxaparin (Lovenox) 40 MG/0.4ML SUBQ injection 40 mg    DVT Pharmacologic prophylaxis: Aspirin 325 mg           Code Status: Not on file  House: No urinary catheter in place  House Duration (in days):   Central line:    FINA: 7/16/2024    Discharge is dependent on: course  At this point Mr. Kapoor is expected to be discharge to: home    The 21st Century Cures Act makes medical notes like these available to patients in the interest of transparency. Please be advised this is a medical document. Medical documents are intended to carry relevant information, facts as evident, and the clinical opinion of the practitioner. The medical note is intended as peer to peer communication and may appear blunt or direct. It is written in medical language and may contain abbreviations or verbiage that are unfamiliar.             **Certification      PHYSICIAN Certification of Need for Inpatient Hospitalization - Initial Certification    Patient will require inpatient services that will reasonably be expected to span two midnight's based on the clinical documentation in H+P.   Based on patients  current state of illness, I anticipate that, after discharge, patient will require TBD.

## 2024-07-17 ENCOUNTER — APPOINTMENT (OUTPATIENT)
Dept: INTERVENTIONAL RADIOLOGY/VASCULAR | Facility: HOSPITAL | Age: 74
End: 2024-07-17
Attending: NURSE PRACTITIONER
Payer: MEDICARE

## 2024-07-17 VITALS
RESPIRATION RATE: 20 BRPM | OXYGEN SATURATION: 96 % | TEMPERATURE: 98 F | BODY MASS INDEX: 29.9 KG/M2 | HEART RATE: 69 BPM | DIASTOLIC BLOOD PRESSURE: 79 MMHG | WEIGHT: 197.31 LBS | SYSTOLIC BLOOD PRESSURE: 135 MMHG | HEIGHT: 68 IN

## 2024-07-17 PROBLEM — I63.9 CEREBROVASCULAR ACCIDENT (CVA) (HCC): Status: ACTIVE | Noted: 2024-07-17

## 2024-07-17 LAB
ANION GAP SERPL CALC-SCNC: 3 MMOL/L (ref 0–18)
BASOPHILS # BLD AUTO: 0.08 X10(3) UL (ref 0–0.2)
BASOPHILS NFR BLD AUTO: 1 %
BUN BLD-MCNC: 15 MG/DL (ref 9–23)
CALCIUM BLD-MCNC: 8.9 MG/DL (ref 8.7–10.4)
CHLORIDE SERPL-SCNC: 106 MMOL/L (ref 98–112)
CO2 SERPL-SCNC: 27 MMOL/L (ref 21–32)
CREAT BLD-MCNC: 0.97 MG/DL
EGFRCR SERPLBLD CKD-EPI 2021: 81 ML/MIN/1.73M2 (ref 60–?)
EOSINOPHIL # BLD AUTO: 0.64 X10(3) UL (ref 0–0.7)
EOSINOPHIL NFR BLD AUTO: 7.9 %
ERYTHROCYTE [DISTWIDTH] IN BLOOD BY AUTOMATED COUNT: 12.6 %
GLUCOSE BLD-MCNC: 253 MG/DL (ref 70–99)
GLUCOSE BLD-MCNC: 253 MG/DL (ref 70–99)
GLUCOSE BLD-MCNC: 295 MG/DL (ref 70–99)
HCT VFR BLD AUTO: 34.5 %
HGB BLD-MCNC: 12.5 G/DL
IMM GRANULOCYTES # BLD AUTO: 0.03 X10(3) UL (ref 0–1)
IMM GRANULOCYTES NFR BLD: 0.4 %
LYMPHOCYTES # BLD AUTO: 2.37 X10(3) UL (ref 1–4)
LYMPHOCYTES NFR BLD AUTO: 29.1 %
MCH RBC QN AUTO: 32.2 PG (ref 26–34)
MCHC RBC AUTO-ENTMCNC: 36.2 G/DL (ref 31–37)
MCV RBC AUTO: 88.9 FL
MONOCYTES # BLD AUTO: 0.75 X10(3) UL (ref 0.1–1)
MONOCYTES NFR BLD AUTO: 9.2 %
NEUTROPHILS # BLD AUTO: 4.27 X10 (3) UL (ref 1.5–7.7)
NEUTROPHILS # BLD AUTO: 4.27 X10(3) UL (ref 1.5–7.7)
NEUTROPHILS NFR BLD AUTO: 52.4 %
OSMOLALITY SERPL CALC.SUM OF ELEC: 294 MOSM/KG (ref 275–295)
PLATELET # BLD AUTO: 176 10(3)UL (ref 150–450)
POTASSIUM SERPL-SCNC: 4.1 MMOL/L (ref 3.5–5.1)
RBC # BLD AUTO: 3.88 X10(6)UL
SODIUM SERPL-SCNC: 136 MMOL/L (ref 136–145)
WBC # BLD AUTO: 8.1 X10(3) UL (ref 4–11)

## 2024-07-17 PROCEDURE — 0JH632Z INSERTION OF MONITORING DEVICE INTO CHEST SUBCUTANEOUS TISSUE AND FASCIA, PERCUTANEOUS APPROACH: ICD-10-PCS | Performed by: NURSE PRACTITIONER

## 2024-07-17 PROCEDURE — 99239 HOSP IP/OBS DSCHRG MGMT >30: CPT | Performed by: HOSPITALIST

## 2024-07-17 PROCEDURE — 99233 SBSQ HOSP IP/OBS HIGH 50: CPT | Performed by: OTHER

## 2024-07-17 RX ORDER — LANCETS 33 GAUGE
EACH MISCELLANEOUS
Qty: 100 EACH | Refills: 6 | Status: SHIPPED | OUTPATIENT
Start: 2024-07-17

## 2024-07-17 RX ORDER — BLOOD-GLUCOSE METER
EACH MISCELLANEOUS
Qty: 1 KIT | Refills: 0 | Status: SHIPPED | OUTPATIENT
Start: 2024-07-17

## 2024-07-17 RX ORDER — INSULIN DEGLUDEC 100 U/ML
22 INJECTION, SOLUTION SUBCUTANEOUS NIGHTLY
Status: DISCONTINUED | OUTPATIENT
Start: 2024-07-17 | End: 2024-07-17

## 2024-07-17 RX ORDER — INSULIN GLARGINE 100 [IU]/ML
22 INJECTION, SOLUTION SUBCUTANEOUS NIGHTLY
Qty: 5 EACH | Refills: 0 | Status: SHIPPED | OUTPATIENT
Start: 2024-07-17

## 2024-07-17 RX ORDER — PEN NEEDLE, DIABETIC 32GX 5/32"
NEEDLE, DISPOSABLE MISCELLANEOUS
Qty: 100 EACH | Refills: 6 | Status: SHIPPED | OUTPATIENT
Start: 2024-07-17

## 2024-07-17 RX ORDER — BLOOD SUGAR DIAGNOSTIC
STRIP MISCELLANEOUS
Qty: 200 STRIP | Refills: 6 | Status: SHIPPED | OUTPATIENT
Start: 2024-07-17

## 2024-07-17 RX ORDER — LIDOCAINE HYDROCHLORIDE AND EPINEPHRINE BITARTRATE 20; .01 MG/ML; MG/ML
INJECTION, SOLUTION SUBCUTANEOUS
Status: COMPLETED
Start: 2024-07-17 | End: 2024-07-17

## 2024-07-17 NOTE — PROCEDURES
[facility]  Implantable Loop Recorder Implant Procedure Note    Tahir Kapoor Patient Status:  Outpatient in a Bed    4/15/1950 MRN [id number]   Location [facility] INTERVENTIONAL SUITES Attending No att. providers found   Hosp Day # 2 PCP No primary care provider on file.     OPERATION(S) PERFORMED:   1. Implantable Loop Recorder Implantation     : Yamini GALLARDO  INDICATION: cryptogenic stroke   COMPLICATIONS: None      ESTIMATED BLOOD LOSS: Minimal.  SEDATION:None       METHODS: The patient was brought to the EP lab in a nonsedated state after providing informed consent. The area if the ILR was cleaned, prepped and draped in sterile fashion.  After administering 1% lidocaine for local anesthesia, an incision was made with the provided tool. The ILR was inserted under the skin.    Hemostasis was achieved with manual pressure.    Steristrips were placed with a occlusive dressing.     CONCLUSIONS:   1. Status post successful implantation of a [employer] ILR. R waves 0.62mV    Yamini GALLARDO  [facility]

## 2024-07-17 NOTE — DISCHARGE SUMMARY
[place] [facility]OSPITAL[facility]  DISCHARGE SUMMARY     Tahir Kapoor Patient Status:  Inpatient    4/15/1950 MRN [id number]   Location [facility] 3NE-A Attending Joesph Guillen MD   Hosp Day # 2 PCP No primary care provider on file.     Date of Admission: 7/15/2024  Date of Discharge:   24    Discharge Disposition: home    Discharge Diagnosis:  #Gait imbalance, dizziness, N, V  #acute and sub acute CVAs, multiple  CTA H&N no acute findings  MRI brain noted  Neuro on Cs. D/w neuro. ASA and plavix x 21 d then monotherapy  PT/OT     #severe hypokinesis on echo  -consult cards  -had stress last month. Defer further w/u to cards  -cards recommending linq prior to DC. To be done today then DC after     #Hypertension  Cont bp meds  PRN hydralazine     #h/o CAD     #new onset DM type 2   A1c 10.6%  IVF  Reviewed in detail  DM APN, nutrition  Increased degludec  Home w/ insulin. Will simplify insulin at DC to just medium sliding scale and lantus. Consider prandial insulin and correction factor as pt gets used to insulin. F/u TCC for further adjustments in insulin   Per outpt pharmacy, insurance will only cover supplies if BS checked daily     History of Present Illness: Tahir Kapoor is a 74 year old male with  h/o CAD and hypertension. The gentleman reports that last week his norvasc dose was doubled to 5 BID form 2.5 BID. The next day he notes waking up from a nap with ongoing nausea, emesis, feeling very unsteady on his feet and dizzy. N, V finally subsided but pt continued to feel that his gait was unsteady and began to use his wifes' cane He states he seems to be dragging to the left. Pt notes he eventually needed her walker. Wife reports the left side of his mouth and eye seem uneven/ droopy. Pt had some difficulty with speech the first night ( last Tuesday) but eventually was able to put words together again. No vision changes.        Brief Synopsis: pt found to have multiple acute and subacute CVAs on MRI.  CTA H&N neck neg. No signs of arrythmia but concern for underlying. LINQ placed prior to DC to monitor. DAPT for 21d then asa. Sx's improved. Found new onset DM2 w/ A1c 10.6. started on insulin and educated. Will need further adjustments as outpt.     Lace+ Score: 58  59-90 High Risk  29-58 Medium Risk  0-28   Low Risk  Patient was referred to the [facility].    TCM Follow-Up Recommendation:  LACE 29-58: Moderate Risk of readmission after discharge from the hospital.      Procedures during hospitalization:   LINQ    Incidental or significant findings and recommendations (brief descriptions):  none    Lab/Test results pending at Discharge:   none    Consultants:  Neuro, cards    Discharge Medication List:     Discharge Medications        START taking these medications        Instructions Prescription details   BD Pen Needle Loree U/F 32G X 4 MM Misc  Generic drug: Insulin Pen Needle      Use a new pen needle with each injection as directed by your doctor. Check blood sugar daily   Quantity: 100 each  Refills: 6     clopidogrel 75 MG Tabs  Commonly known as: Plavix      Take 1 tablet (75 mg total) by mouth daily.   Quantity: 30 tablet  Refills: 1     Lantus SoloStar 100 UNIT/ML Sopn  Generic drug: insulin glargine      Inject 22 Units into the skin nightly.   Quantity: 5 each  Refills: 0     NovoLOG FlexPen 100 UNIT/ML Sopn  Generic drug: insulin aspart      Test blood glucose 4 times daily before meals Inject 2 unit if blood glucose is between 141-180 mg/dL Inject 3 units if blood glucose is between 181-220 mg/dL Inject 6 units if blood glucose is between 221-260 mg/dL Inject 8 units if blood glucose is between 261-300 mg/dL Inject 10 units if blood glucose is between 301-350 mg/dL Call your physician if blood glucose is greater than 351 mg/dL,   Quantity: 5 each  Refills: 3     OneTouch Delica Lancets 33G Misc      Check blood sugar daily   Quantity: 100 each  Refills: 6     OneTouch Verio Flex  System w/Device Kit      Check blood sugar daily   Quantity: 1 kit  Refills: 0     OneTouch Verio Strp      Check blood sugar daily   Quantity: 200 strip  Refills: 6            CHANGE how you take these medications        Instructions Prescription details   aspirin 325 MG Tabs  What changed:   medication strength  how much to take      Take 1 tablet (325 mg total) by mouth daily.   Quantity: 30 tablet  Refills: 1            CONTINUE taking these medications        Instructions Prescription details   amLODIPine 2.5 MG Tabs  Commonly known as: Norvasc      Take 1 tablet (2.5 mg total) by mouth in the morning and 1 tablet (2.5 mg total) before bedtime.   Refills: 0     atorvastatin 80 MG Tabs  Commonly known as: Lipitor      Take 0.5 tablets (40 mg total) by mouth nightly.   Refills: 0     losartan 50 MG Tabs  Commonly known as: Cozaar      Take 1 tablet (50 mg total) by mouth daily.   Refills: 0     meclizine 25 MG Tabs  Commonly known as: Antivert      Take 1 tablet (25 mg total) by mouth in the morning and 1 tablet (25 mg total) before bedtime.   Refills: 0     metoprolol succinate ER 50 MG Tb24  Commonly known as: Toprol XL      Take 1 tablet (50 mg total) by mouth daily.   Refills: 0            STOP taking these medications      Effient 10 MG Tabs  Generic drug: prasugrel        ibuprofen 200 MG Tabs  Commonly known as: Motrin        lisinopril 2.5 MG Tabs  Commonly known as: Prinivil; Zestril                  Where to Get Your Medications        These medications were sent to [facility] [address] [place] [address] [phone], [phone]  [address] [place] [address]      Phone: [phone]   aspirin 325 MG Tabs  BD Pen Needle Loree U/F 32G X 4 MM Misc  clopidogrel 75 MG Tabs  Lantus SoloStar 100 UNIT/ML Sopn  NovoLOG FlexPen 100 UNIT/ML Sopn  OneTouch Delica Lancets 33G Misc  OneTouch Verio Flex System w/Device Kit  OneTouch Verio Strp         [place]    Follow-up  appointment:   PCP    Schedule an appointment as soon as possible for a visit      Pratik Monteiro MD  [address]  [facility] [address]  [phone]    Schedule an appointment as soon as possible for a visit in 3 week(s)  stroke follow up    Dewayne Alexander MD  [address] [place] [address]  [phone]    Follow up  office will call to schedule follow up in \"device clinic\" nurse 1 week, Dr. Alexander 1 month    Appointments for Next 30 Days 7/17/2024 - 8/16/2024        Date Arrival Time Visit Type Length Department Provider     7/17/2024  3:00 PM  EDW IVS IP LOOP REC HOLDING [3968] 60 min [facility]H IVS HOLDING    Patient Instructions:         Location Instructions:     Your appointment is scheduled in the Interventional Suites Department at [facility].&nbsp; The address is [address] [place] [address].&nbsp; To reach Registration, park in the [facility] and enter the doors located on the ground floor.&nbsp; Proceed to Registration, located across from the [facility], to the left of the Information Desk.  A nurse from Interventional Radiology will be calling you the day prior to your procedure with your arrival time.  Masks are optional for all patients and visitors, unless otherwise indicated.               7/24/2024 11:00 AM  DIAB STEP 1/INITIAL  [5228] 60 min [facility], [facility] [address], Coleen Thurman RN, [facility]    Patient Instructions:     WHO TO CONTACT TO CHANGE APPOINTMENT   If you need to make changes to your appointment or have questions,  please call the Diabetes Center at [phone].    INITIAL INDIVIDUAL ASSESSMENTS:   An assessment and meter training will begin this appointment.  Educational needs will be discussed at that time.    STEP ONE SATISFIER  This is a 1-hour appointment taught by a certified diabetes educator. Half of this appointment time is dedicated to the development  of a personalized education plan.  This plan is developed privately in a 1:1 session with the educator.   The other half hour is devoted  to learning to use a blood glucose meter.    FAMILY INVOLVEMENT  Insurance expects the patient (child or adult) to be present for the appointment.  This appointment is intended for adult patients, teen patients with adult family member and parents of young children with diabetes.  Please make arrangements for someone to care for your children under  the age of 8.    ARRIVAL TIME  Patients should arrive 10 minutes prior to their appointment time.    INSURANCE QUESTIONS  Please bring your insurance card, 's license/ID, the order for  education from your doctor, any pertinent lab results and a list of  medications patient is taking. Please call your insurance company to check your coverage, call the [phone] on the back of your insurance card.        Location Instructions:     Your appointment is scheduled at [address] [place] [address].  Masks are optional for all patients and visitors, unless otherwise indicated.                      Vital signs:  Temp:  [98 °F (36.7 °C)-98.7 °F (37.1 °C)] 98 °F (36.7 °C)  Pulse:  [59-69] 60  Resp:  [18-19] 19  BP: (124-156)/(68-87) 155/83  SpO2:  [92 %-97 %] 93 %    Physical Exam:    General: No acute distress   Lungs: clear to auscultation  Cardiovascular: S1, S2  Abdomen: Soft      -----------------------------------------------------------------------------------------------  PATIENT DISCHARGE INSTRUCTIONS: See electronic chart    oJesph Guillen MD    Total time spent on discharge plannin minutes     The  [employer] Cures Act makes medical notes like these available to patients in the interest of transparency. Please be advised this is a medical document. Medical documents are intended to carry relevant information, facts as evident, and the clinical opinion of the practitioner. The medical note is intended as  peer to peer communication and may appear blunt or direct. It is written in medical language and may contain abbreviations or verbiage that are unfamiliar.

## 2024-07-17 NOTE — PROGRESS NOTES
Linq insertion at bedside with Yamini Abreu. Pt tolerated procedure well. Aquacel to chest is soft, clean, and dry. No bleeding or hematoma. Pt denies c/o pain or discomfort. Reviewed discharge instructions with pt and wife, verbalizes understanding. Report called to Prerna KAPLAN. Pt transported back to room in stable condition without c/o.

## 2024-07-17 NOTE — DISCHARGE INSTRUCTIONS
LINQ INSERTION DISCHARGE INSTRUCTIONS:    You will go home with a dressing over the incision site. This will need to remain in place for 5 days. You may sponge bathe until the 5th day.     On day 5, wash your hands with soap and water for 20 seconds prior to removing the dressing. On day 5, you may shower and remove dressing. Leave the steri strips in place. They will fall of on their own in 7-10 days. Make an appointment to follow up at the pacemaker clinic for a site check in 1 week.     Signs of infection... Redness at the puncture site, increased pain, swelling, fever or drainage. If you have any of these symptoms, call your doctor's appointment for an appointment.     You may have slight bruising. Take Tylenol for discomfort if you are not allergic.     If you have any questions, problems, or concerns, please contact your doctor's office.     Remember to sleep within 6 feet of your monitor.

## 2024-07-17 NOTE — PHYSICAL THERAPY NOTE
PHYSICAL THERAPY TREATMENT NOTE - INPATIENT    Room Number: 3614/3614-A     Session: 1     Number of Visits to Meet Established Goals: 3    Presenting Problem: facial droop, acute/subacute L cerebellar hemisphere infarct and  Left posterior medulla  Co-Morbidities : DM, HTN, CAD    ASSESSMENT   Patient demonstrates excellent progress this session, goals updated to reflect patient performance.    Patient continues to function near baseline with bed mobility, transfers, and gait. Contributing factors to remaining limitations include decreased functional strength, impaired motor planning, and decreased muscular endurance.  Next session anticipate patient to progress bed mobility, transfers, gait, and stair negotiation.  Physical Therapy will continue to follow patient for duration of hospitalization.    Patient continues to benefit from continued skilled PT services: at discharge to promote prior level of function and safety with additional support and return home with OP PT.    PLAN  PT Treatment Plan: Bed mobility;Body mechanics;Coordination;Patient education;Gait training;Stoop training;Transfer training;Balance training;Strengthening  Rehab Potential : Good  Frequency (Obs): 3-5x/week    CURRENT GOALS     Goal #1 Patient is able to demonstrate supine - sit EOB @ level: independent      Goal #2 Patient is able to demonstrate transfers EOB to/from Chair/Wheelchair at assistance level: modified independent      Goal #3 Patient is able to ambulate 250 feet with assist device: walker - rolling at assistance level: supervision      Goal #4     Goal #5     Goal #6     Goal Comments: Goals established on 2024 all goals ongoing     SUBJECTIVE  \"I am doing ok\"    OBJECTIVE  Precautions: Bed/chair alarm    WEIGHT BEARING RESTRICTION  Weight Bearing Restriction: None                PAIN ASSESSMENT   Ratin          BALANCE                                                                                                                        Static Sitting: Normal  Dynamic Sitting: Good           Static Standing: Fair  Dynamic Standing: Fair -    ACTIVITY TOLERANCE                         O2 WALK         AM-PAC '6-Clicks' INPATIENT SHORT FORM - BASIC MOBILITY  How much difficulty does the patient currently have...  Patient Difficulty: Turning over in bed (including adjusting bedclothes, sheets and blankets)?: None   Patient Difficulty: Sitting down on and standing up from a chair with arms (e.g., wheelchair, bedside commode, etc.): None   Patient Difficulty: Moving from lying on back to sitting on the side of the bed?: None   How much help from another person does the patient currently need...   Help from Another: Moving to and from a bed to a chair (including a wheelchair)?: A Little   Help from Another: Need to walk in hospital room?: A Little   Help from Another: Climbing 3-5 steps with a railing?: A Little       AM-PAC Score:  Raw Score: 21   Approx Degree of Impairment: 28.97%   Standardized Score (AM-PAC Scale): 50.25   CMS Modifier (G-Code): CJ    FUNCTIONAL ABILITY STATUS  Gait Assessment   Functional Mobility/Gait Assessment  Gait Assistance: Supervision  Distance (ft): 150 x 2  Assistive Device: Rolling walker  Pattern: L Decreased stance time;Ataxic (Narrow base of support with increasing left lean with distance, able to correct temporarily with cueing)  Stairs: Stairs  How Many Stairs: 6  Device: 1 Rail  Assist: Supervision  Pattern: Ascend and Descend  Ascend and Descend : Step to    Skilled Therapy Provided    Bed Mobility:  Rolling: NT   Supine<>Sit: SBA   Sit<>Supine: NT     Transfer Mobility:  Sit<>Stand: SBA   Stand<>Sit: SBA   Gait: SBA with RW    Therapist's Comments: spouse witnessed session - Pt responds well to verbal cues for wider PETER, encourage heel/toe sequence      THERAPEUTIC EXERCISES  Lower Extremity Alternating marching  Ankle pumps  Hip adduction squeezes  Knee extension  3-way hip       Upper Extremity Elbow flex/ext and  - open/close     Position Sitting & Standing     Repetitions   10   Sets   1     Patient End of Session: Up in chair;Needs met;Call light within reach;RN aware of session/findings;All patient questions and concerns addressed    PT Session Time: 25 minutes  Gait Training: 15 minutes  Therapeutic Activity: 0 minutes  Therapeutic Exercise: 0 minutes   Neuromuscular Re-education: 8 minutes

## 2024-07-17 NOTE — PROGRESS NOTES
NURSING DISCHARGE NOTE    Discharged Home via Wheelchair.  Accompanied by Family member and RN  Belongings Taken by patient/family.

## 2024-07-17 NOTE — PROGRESS NOTES
Assumed care 0730.  Alert and oriented x4  Neuro q 4  [facility] daily  Accu checks  Carb control diet  Diabetic education  0.9 @ 75 ml/hr  RA  Lovenox for VTE prophylaxis  Electrolyte NC protocol.   Denies pain  Wife at bedside  LINQ to be placed today  Possible discharge.  Continue to monitor pt.

## 2024-07-17 NOTE — PLAN OF CARE
Assumed care @ 1900  A&OX4  2L O2 NC  NSR on tele  Stroke protocol and precautions  Lovenox  Cardiac diet  Urinal at bedside  Meds in MAR given  Family at bedside  NeuroQ4  NIH daily  /120   Diabetes edu  Continuing to meet pt needs  Safety precautions on place  Need further details reference flowsheets

## 2024-07-17 NOTE — DIETARY NOTE
[facility]   part of [facility]   CLINICAL NUTRITION    Tahir Kapoor     Admitting diagnosis:  Gait instability [R26.81]  Facial droop [R29.810]    Ht: 172.7 cm (5' 8\")  Wt: 89.5 kg (197 lb 5 oz).   Body mass index is 30 kg/m².  IBW: 70 kg    Wt Readings from Last 6 Encounters:   07/15/24 89.5 kg (197 lb 5 oz)   11/26/13 92 kg (202 lb 13.2 oz)        Labs/Meds reviewed    Diet:       Procedures    Cardiac diet Cardiac; Is Patient on Accuchecks? No     Percent Meals Eaten (last 3 days)       None          Pt chart reviewed d/t New Onset DM.  Patient reports good appetite at this time.  Nursing notes reports   intake for last meal.  Tolerating po diet without diarrhea, emesis, or constipation.   No significant weight changes noted.     PMH includes HTN, CAD.  Pt seen for MD consult for new onset DM.  Plan for pt to discharge on insulin regimen.  At time of visit, pt getting ready for discharge.  He verbalizes being overwhelmed with information and states he probably won't retain any information at this time.  He has been getting education throughout visit.  Briefly touched on foods that are carbs, and discussed importance of portion control.  Pt states he has been on WW in the past and is familiar with label reading and portion control.  Handouts on Carb Counting provided to pt along with contact info for questions post discharge.    Patient is at low nutrition risk at this time.    Please consult if patient status changes or nutrition issues arise.    Sade Radford RD, LDN, [facility]  Clinical Dietitian  Phone [phone]

## 2024-07-17 NOTE — PROGRESS NOTES
[facility]  RITA Neurology Progress Note    Tahir Kapoor Patient Status:  Inpatient    4/15/1950 MRN [id number]   Location [facility] 3NE-A Attending No att. providers found   Hosp Day # 2 PCP No primary care provider on file.     CC: Stroke    Subjective:  Tahir Kapoor is a 74 year old male with past medical history significant for coronary disease and hypertension, who presented to emergency department with complaints of 3 to 4-day history of increasing unsteadiness, dizziness, nausea and vomiting.  No headache, photophobia, phonophobia, diplopia, dysarthria, dysphagia or significant new weakness. Work up revealed CT head and CTA head and neck unremarkable, MRI brain showed multifocal acute to subacute infarcts in the left cerebellar and posterior medulla. Patent was admitted and stroke work up completed.    Patient seen for a follow up visit today. In bed, doing well, feeling better, almost back to normal. Still with slight facial droop, no changes in speech, no double or blurry vision, no headache, denies any new focal weakness or paresthesias.          MEDICATIONS:  Prior to Admission Medications   Medication Sig    Blood Glucose Monitoring Suppl (ONETOUCH VERIO FLEX SYSTEM) w/Device Does not apply Kit Check blood sugar daily    Glucose Blood (ONETOUCH VERIO) In Vitro Strip Check blood sugar daily    Insulin Pen Needle (BD PEN NEEDLE CORKY U/F) 32G X 4 MM Does not apply Misc Use a new pen needle with each injection as directed by your doctor. Check blood sugar daily    OneTouch Delica Lancets 33G Does not apply Misc Check blood sugar daily    insulin glargine (LANTUS SOLOSTAR) 100 UNIT/ML Subcutaneous Solution Pen-injector Inject 22 Units into the skin nightly.    insulin aspart (NOVOLOG FLEXPEN) 100 Units/mL Subcutaneous Solution Pen-injector Test blood glucose 4 times daily before meals Inject 2 unit if blood glucose is between 141-180 mg/dL Inject 3 units if blood glucose is  between 181-220 mg/dL Inject 6 units if blood glucose is between 221-260 mg/dL Inject 8 units if blood glucose is between 261-300 mg/dL Inject 10 units if blood glucose is between 301-350 mg/dL Call your physician if blood glucose is greater than 351 mg/dL,    aspirin 325 MG Oral Tab Take 1 tablet (325 mg total) by mouth daily.    clopidogrel 75 MG Oral Tab Take 1 tablet (75 mg total) by mouth daily.    amLODIPine 2.5 MG Oral Tab Take 1 tablet (2.5 mg total) by mouth in the morning and 1 tablet (2.5 mg total) before bedtime.    losartan 50 MG Oral Tab Take 1 tablet (50 mg total) by mouth daily.    meclizine 25 MG Oral Tab Take 1 tablet (25 mg total) by mouth in the morning and 1 tablet (25 mg total) before bedtime.    metoprolol succinate ER 50 MG Oral Tablet 24 Hr Take 1 tablet (50 mg total) by mouth daily.    atorvastatin 40 MG Oral Tab Take 1 tablet (40 mg total) by mouth nightly.     Current Facility-Administered Medications   Medication Dose Route Frequency    insulin degludec (Tresiba) 100 units/mL flextouch 22 Units  22 Units Subcutaneous Nightly    clopidogrel (Plavix) tab 75 mg  75 mg Oral Daily    sodium chloride 0.9% infusion   Intravenous Continuous    labetalol (Trandate) 5 mg/mL injection 10 mg  10 mg Intravenous Q10 Min PRN    hydrALAzine (Apresoline) 20 mg/mL injection 10 mg  10 mg Intravenous Q2H PRN    ondansetron (Zofran) 4 MG/2ML injection 4 mg  4 mg Intravenous Q6H PRN    metoclopramide (Reglan) 5 mg/mL injection 10 mg  10 mg Intravenous Q8H PRN    aspirin 300 MG rectal suppository 300 mg  300 mg Rectal Daily    Or    aspirin tab 325 mg  325 mg Oral Daily    enoxaparin (Lovenox) 40 MG/0.4ML SUBQ injection 40 mg  40 mg Subcutaneous Daily    acetaminophen (Tylenol Extra Strength) tab 1,000 mg  1,000 mg Oral Q8H PRN    melatonin tab 3 mg  3 mg Oral Nightly PRN    polyethylene glycol (PEG 3350) (Miralax) 17 g oral packet 17 g  17 g Oral Daily PRN    sennosides (Senokot) tab 17.2 mg  17.2 mg Oral  Nightly PRN    bisacodyl (Dulcolax) 10 MG rectal suppository 10 mg  10 mg Rectal Daily PRN    fleet enema (Fleet) 7-19 GM/118ML rectal enema 133 mL  1 enema Rectal Once PRN    benzonatate (Tessalon) cap 200 mg  200 mg Oral TID PRN    glycerin-hypromellose- (Artificial Tears) 0.2-0.2-1 % ophthalmic solution 1 drop  1 drop Both Eyes QID PRN    sodium chloride (Saline Mist) 0.65 % nasal solution 1 spray  1 spray Each Nare Q3H PRN    hydrALAzine (Apresoline) 20 mg/mL injection 5 mg  5 mg Intravenous Q6H PRN    glucose (Dex4) 15 GM/59ML oral liquid 15 g  15 g Oral Q15 Min PRN    Or    glucose (Glutose) 40% oral gel 15 g  15 g Oral Q15 Min PRN    Or    glucose-vitamin C (Dex-4) chewable tab 4 tablet  4 tablet Oral Q15 Min PRN    Or    dextrose 50% injection 50 mL  50 mL Intravenous Q15 Min PRN    Or    glucose (Dex4) 15 GM/59ML oral liquid 30 g  30 g Oral Q15 Min PRN    Or    glucose (Glutose) 40% oral gel 30 g  30 g Oral Q15 Min PRN    Or    glucose-vitamin C (Dex-4) chewable tab 8 tablet  8 tablet Oral Q15 Min PRN    insulin aspart (NovoLOG) 100 Units/mL FlexPen 1-68 Units  1-68 Units Subcutaneous TID CC    amLODIPine (Norvasc) tab 2.5 mg  2.5 mg Oral BID    atorvastatin (Lipitor) tab 40 mg  40 mg Oral Nightly    losartan (Cozaar) tab 50 mg  50 mg Oral Daily    metoprolol succinate ER (Toprol XL) 24 hr tab 50 mg  50 mg Oral Daily Beta Blocker    insulin aspart (NovoLOG) 100 Units/mL FlexPen 1-68 Units  1-68 Units Subcutaneous TID AC&HS       REVIEW OF SYSTEMS:  A 10-point system was reviewed.  Pertinent positives and negatives are noted in HPI.      PHYSICAL EXAMINATION:  VITAL SIGNS: /79 (BP Location: Right arm)   Pulse 69   Temp 97.5 °F (36.4 °C) (Temporal)   Resp 20   Ht 68\"   Wt 197 lb 5 oz (89.5 kg)   SpO2 96%   BMI 30.00 kg/m²   GENERAL:  Patient is a 74 year old male in no acute distress.  HEENT:  Normocephalic, atraumatic  ABD: Soft, non tender  SKIN: Warm, dry, no rashes    NEUROLOGICAL:    Mental status: Oriented to person, place, and time   Speech: Fluent, no dysarthria  Memory and comprehension: Intact   Cranial Nerves: VFF, PERRL 3mm brisk, EOMI, no nystagmus, facial sensation intact, face symmetric, tongue midline, shoulder shrug equal, remainder CN intact  Motor: mild left pronator drift, no other significant focal arm or leg weakness. Motor strength is 5 out of 5 in all extremities.   Sensory: Intact to light touch bilaterally  Coordination: FTN intact,  mild left dysmetria seen  Gait: Deferred      Imaging/Diagnostics:  MRI BRAIN (CPT=70551)    Result Date: 7/15/2024  CONCLUSION:  Multifocal punctate acute-subacute infarcts involving the left cerebellar hemisphere and left posterior medulla.  COMMUNICATION:  The findings were communicated with Dr. Mahmood at 2205 on 7/15/2024. There was appropriate read back.   LOCATION:  [facility]   Dictated by (CST): Rangel Healy MD on 7/15/2024 at 10:00 PM     Finalized by (CST): Rangel Healy MD on 7/15/2024 at 10:06 PM       CTA BRAIN + CTA CAROTIDS (CPT=70496/26601)    Result Date: 7/15/2024  CONCLUSION:  No acute intracranial abnormality.  Patent intracranial and cervical arterial vasculature, without large vessel occlusion or aneurysm.    LOCATION:  [facility]   Dictated by (CST): Rangel Healy MD on 7/15/2024 at 5:51 PM     Finalized by (CST): Rangel Healy MD on 7/15/2024 at 5:56 PM          Labs:  Recent Labs   Lab 07/15/24  1537 07/17/24  0830   RBC 4.38 3.88   HGB 14.2 12.5*   HCT 39.2 34.5*   MCV 89.5 88.9   MCH 32.4 32.2   MCHC 36.2 36.2   RDW 12.3 12.6   NEPRELIM 5.42 4.27   WBC 9.5 8.1   .0 176.0         Recent Labs   Lab 07/12/24  1050 07/15/24  1537 07/17/24  0830   * 343* 295*   BUN 25* 26* 15   CREATSERUM 1.17 1.03 0.97   EGFRCR 65 76 81   CA 9.4 9.5 8.9    136 136   K 4.4 4.2 4.1    104 106   CO2 28.0 26.0 27.0       Assessment & Plan:    A 74 year old male with:     Multifocal punctate acute/subacute infarcts  involving the left cerebellar hemisphere and left posterior medulla.  Patient clinically doing much better.    -  CTA of the head and neck was unremarkable.   - Echocardiogram was unremarkable  - Stroke labs:  LDL 63, A1c 10.3. Started on aspirin 325 mg daily, atorvastatin 40 mg daily and Plavix 75 mg daily.    - Advised OT/PT/speech and language/rehab eval.  - Will need outpatient event monitoring for 30 days.Patient is scheduled to get a LINQ recorder implant before discharge per cards.   Elevated hemoglobin A1c: 10.3.  -Educated on importance of proper and strict diabetes management  - Nutritionist to see, diabetic APN to see  - Advised further management per hospitalist/PCP.  Patient to follow-up with neurology services 3 weeks after discharge.        Is this a shared or split note between Advanced Practice Provider and Physician? Yes       Jasmin GERBER  [facility]  7/17/2024, 1:27 PM      Impression/plan/MDM:  Patient seen and examined personally.  Investigations reviewed.    Multifocal punctate acute/subacute infarcts involving the left cerebellar hemisphere and left posterior medulla.  Patient clinically doing much better.  Echocardiogram and CTA of the head and neck was unremarkable.  LDL 63, A1c 10.3.  Advised aspirin 325 mg daily, atorvastatin 40 mg daily and Plavix 75 mg daily.  Advised OT/PT/speech and language/rehab eval. cardiac monitoring device placed by cardiology.  Elevated hemoglobin A1c: 10.3.  Patient and wife are both surprised at that high level of A1c.  Advised further management per hospitalist/PCP.  Patient to follow-up with neurology services 3 weeks after discharge

## 2024-07-18 ENCOUNTER — PATIENT OUTREACH (OUTPATIENT)
Dept: CASE MANAGEMENT | Age: 74
End: 2024-07-18

## 2024-07-19 ENCOUNTER — PATIENT OUTREACH (OUTPATIENT)
Dept: CASE MANAGEMENT | Age: 74
End: 2024-07-19

## 2024-07-19 NOTE — PROGRESS NOTES
Hospital follow up.    TCC request.    Last A1C Value: 10.3% Date: [7/15/2024]  Wednesday 7/24 @11  Existing appointment    Visit Type: Stroke follow-up  Date of TIA/Stroke: 07/15/2024  Type of Stroke: Ischemic  Patient to follow-up: 4 weeks    No answer, left a voicemail with callback information.

## 2024-07-22 NOTE — PROGRESS NOTES
Hospital follow up.    TCC request.    Last A1C Value: 10.3% Date: [7/15/2024]  Wednesday 7/24 @11  Existing appointment    Visit Type: Stroke follow-up  Date of TIA/Stroke: 07/15/2024  Type of Stroke: Ischemic  Patient to follow-up: 4 weeks    No answer, left a voicemail with callback information.    Closing encounter

## 2024-07-24 ENCOUNTER — NURSE ONLY (OUTPATIENT)
Dept: ENDOCRINOLOGY CLINIC | Facility: CLINIC | Age: 74
End: 2024-07-24
Payer: MEDICARE

## 2024-07-24 DIAGNOSIS — E11.65 TYPE 2 DIABETES MELLITUS WITH HYPERGLYCEMIA, WITHOUT LONG-TERM CURRENT USE OF INSULIN (HCC): ICD-10-CM

## 2024-07-24 PROCEDURE — G0108 DIAB MANAGE TRN  PER INDIV: HCPCS | Performed by: DIETITIAN, REGISTERED

## 2024-07-24 PROCEDURE — 1111F DSCHRG MED/CURRENT MED MERGE: CPT | Performed by: DIETITIAN, REGISTERED

## 2024-07-24 RX ORDER — BLOOD SUGAR DIAGNOSTIC
STRIP MISCELLANEOUS
Qty: 400 STRIP | Refills: 3 | Status: SHIPPED | OUTPATIENT
Start: 2024-07-24 | End: 2024-07-24

## 2024-07-24 NOTE — PROGRESS NOTES
Tahir Kapoor  : 4/15/1950 attended [facility] Diabetic Education:  Pt. accompanied by wife to visit;they were 30mins late for their appt. because they were told to go to [address] in [place] instead of [address] [address]    Date: 2024  Referring Provider: Pt. has no PCP;referred for education from Jef, SAMI from [facility]. Pt.'s wife had refused to see [facility] because she didn't understand how they would help them.     Start time: 11:30am End time: 12:30pm   Spent 50% of the visit attempting to contact Primary care offices that were accepting new pt.'s; were given the contact number for Dr. ANGELICA Duggan, [phone]. Only options at that office was seeing an APN as opposed to an MD. Also tried contacted [facility] but office was in a meeting so unable to speak to anyone.  [facility]ed [facility] to schedule an appt. for pt. to be able to get refills until he is established with a PCP. Scheduled tomorrow at 1:30pm.    HgbA1C (%)   Date Value   07/15/2024 10.3 (H)      Healthy Eating:  Definition of carbohydrates vs protein and fat;healthy plate method  Impact of carb on blood sugar  Benefits of high fiber vs refined types of carb    Taking Medications:   Injectables:  Inject Novolog premeal per sliding scale:  141-180 mg/dl   2 units   181-220 mg/dL  3 units  221-260 mg/dl 6 units  261-300 mg/dl  8 units  301-350mg/dl 10 units  >351 call doctor    Adolph 22 units at bedtime  How to use pen:Pt.showed correct return demonstration with use of practice pen.   Site selection guide: Pt. was injecting into inner thigh;instructed to inject in outer thigh, abd or back of the arm (handout provided)  Storage/Disposal: Unopened pens remain refrigerated; once opened, can remain at room temp for 28 days. Pt. had questions about refrigerating insulin;can remain at room temp & discard after that time. Need to dispose of needles in sharps container.    Monitoring: Instructed/reinforced blood  glucose monitoring, testing schedules and target goals:   Fasting / Pre-meal  2 Hour Post-prandial 140-180  Demonstrated ability to perform blood glucose testing on: Onetouch Verio Flex meter  Glucose today was 239 mg/dl ( Pt. didn't eat lunch before appt.)  Comment: Pt. had great difficulty getting enough blood using the Onetouch Delica lancet device; provided a Contour Next lancet device & sample lancets which was improved. Pt. needs a refill on testing supplies;will let [facility] know he needs a refill on testing supplies as well as insulin pens.    SMBG 24 to 24 form sent to scanning  Fastin,275,274,215,247,275 mg/dl    Prelunch: 256,216,215,263,246 mg/dl     Predinner: 236,226,251,187,184 mg/dl  Bedtime:272,251,189,243,275 mg/dl         Problem Solving: Prevention,detection and treatment of acute complications: taught symptoms of hypoglycemia, hyperglycemia, how to treat low blood sugar (Rule of 15) and actions for lowering high blood glucose levels.    Recommendations:    Continue monitoring blood glucose 4 times as directed;will receive refills tomorrow   Discussed basic carb-counting; lack of time  Inject insulin according to sites on handout  Reviewed how to treat a low blood sugar  [facility] with [facility] as scheduled  [place] with education at the [facility]more convenient for pt.    Written materials provided for all areas covered.    Patient verbalized understanding and has no further questions at this time    Coleen Marc RN, [facility]

## 2024-07-25 ENCOUNTER — OFFICE VISIT (OUTPATIENT)
Dept: INTERNAL MEDICINE CLINIC | Facility: CLINIC | Age: 74
End: 2024-07-25
Payer: MEDICARE

## 2024-07-25 VITALS
HEIGHT: 68 IN | SYSTOLIC BLOOD PRESSURE: 141 MMHG | RESPIRATION RATE: 18 BRPM | DIASTOLIC BLOOD PRESSURE: 77 MMHG | WEIGHT: 189 LBS | BODY MASS INDEX: 28.64 KG/M2 | TEMPERATURE: 97 F | HEART RATE: 64 BPM | OXYGEN SATURATION: 97 %

## 2024-07-25 DIAGNOSIS — R26.81 GAIT INSTABILITY: ICD-10-CM

## 2024-07-25 DIAGNOSIS — E78.5 DYSLIPIDEMIA: ICD-10-CM

## 2024-07-25 DIAGNOSIS — I63.40 CEREBROVASCULAR ACCIDENT (CVA) DUE TO EMBOLISM OF CEREBRAL ARTERY (HCC): Primary | ICD-10-CM

## 2024-07-25 DIAGNOSIS — I10 PRIMARY HYPERTENSION: ICD-10-CM

## 2024-07-25 DIAGNOSIS — E11.65 TYPE 2 DIABETES MELLITUS WITH HYPERGLYCEMIA, WITHOUT LONG-TERM CURRENT USE OF INSULIN (HCC): ICD-10-CM

## 2024-07-25 PROCEDURE — 99495 TRANSJ CARE MGMT MOD F2F 14D: CPT | Performed by: NURSE PRACTITIONER

## 2024-07-25 RX ORDER — BLOOD SUGAR DIAGNOSTIC
STRIP MISCELLANEOUS
Qty: 100 STRIP | Refills: 0 | Status: SHIPPED | OUTPATIENT
Start: 2024-07-25 | End: 2024-07-30

## 2024-07-25 RX ORDER — LANCETS
EACH MISCELLANEOUS
Qty: 100 EACH | Refills: 0 | Status: SHIPPED | OUTPATIENT
Start: 2024-07-25

## 2024-07-25 RX ORDER — INSULIN ADMIN. SUPPLIES
INSULIN PEN (EA) SUBCUTANEOUS
Qty: 100 EACH | Refills: 0 | Status: SHIPPED | OUTPATIENT
Start: 2024-07-25

## 2024-07-25 RX ORDER — INSULIN GLARGINE 100 [IU]/ML
27 INJECTION, SOLUTION SUBCUTANEOUS NIGHTLY
COMMUNITY
Start: 2024-07-25

## 2024-07-25 NOTE — PROGRESS NOTES
TCM VISIT  [facility]      HISTORY   CHIEF COMPLAINT: HFU-acute CVA due to embolism of cerebral artery, gait instability, new onset DMII, HTN, dyslipidemia.     HPI: Tahir Kapoor is a 74 year old male here today for hospital follow up for acute CVA due to embolism of cerebral artery, gait instability, new onset DMII, HTN, dyslipidemia.  Tahir Kapoor was [facility]  to Home or Self Care.  Admit Date: 7/15/24. Discharge Date: 7/17/24.     Hospital Discharge Diagnosis:       #Gait imbalance, dizziness, N, V  #acute and sub acute CVAs, multiple  CTA H&N no acute findings  MRI brain noted  Neuro on Cs. D/w neuro. ASA and plavix x 21 d then monotherapy  PT/OT     #severe hypokinesis on echo  -consult cards  -had stress last month. Defer further w/u to cards  -cards recommending linq prior to DC. To be done today then DC after     #Hypertension  Cont bp meds  PRN hydralazine     #h/o CAD     #new onset DM type 2   A1c 10.6%  IVF  Reviewed in detail  DM APN, nutrition  Increased degludec  Home w/ insulin. Will simplify insulin at DC to just medium sliding scale and lantus. Consider prandial insulin and correction factor as pt gets used to insulin. F/u [facility] for further adjustments in insulin   Per outpt pharmacy, insurance will only cover supplies if BS checked daily     Hospital stay was uncomplicated.  Tahir Kapoor was discharge with Linq monitor, Plavix, Lantus, NovoLog, aspirin, stop Effient, ibuprofen, lisinopril and a referral to the [facility] for continued follow up.      Today, patient is being seen for hospital follow-up.  He reports doing okay since discharge.  He is accompanied by his wife at time of exam.    Presented to ED due to unsteady gait.  He reports the week prior his Norvasc dose was doubled from 2.5 mg twice daily to 5 mg twice daily.  The next day he notes waking up from a nap with ongoing nausea and emesis, feeling unsteady on his feet with  dizziness.  Nausea and vomiting finally subsided but patient continued to feel that his gait was unsteady and began to use his wife's cane.  He reports he felt to be dragging to the left.  He also notes he eventually needed to start using his wife's walker.  Wife reports that the left side of his mouth and eye seemed uneven/droopy.  He had some difficulty with speech the first night-prior Tuesday but eventually was able to put words together again.  He was found to have multiple acute and subacute CVAs on MRI.  CTA of head and neck was negative.  No signs of arrhythmia but concern for underlying.  Linq placed prior to discharge to monitor.  He was seen by neurology and started on DAPT.  Symptoms improved.  Found to have new onset DMII with A1c of 10.6% and was started on insulin and educated.  Will need further adjustments as outpatient.  He was cleared by all consultants and discharged home in stable condition.    Interactive contact within 2 business days post discharge first initiated on Date: 7/18/2024      Allergies:  No Known Allergies   Current Meds:  Current Outpatient Medications   Medication Sig Dispense Refill    Blood Glucose Monitoring Suppl (ONETOUCH VERIO FLEX SYSTEM) w/Device Does not apply Kit Check blood sugar daily 1 kit 0    Insulin Pen Needle (BD PEN NEEDLE CORKY U/F) 32G X 4 MM Does not apply Misc Use a new pen needle with each injection as directed by your doctor. Check blood sugar daily 100 each 6    OneTouch Delica Lancets 33G Does not apply Misc Check blood sugar daily 100 each 6    insulin glargine (LANTUS SOLOSTAR) 100 UNIT/ML Subcutaneous Solution Pen-injector Inject 22 Units into the skin nightly. 5 each 0    insulin aspart (NOVOLOG FLEXPEN) 100 Units/mL Subcutaneous Solution Pen-injector Test blood glucose 4 times daily before meals Inject 2 unit if blood glucose is between 141-180 mg/dL Inject 3 units if blood glucose is between 181-220 mg/dL Inject 6 units if blood glucose is between  221-260 mg/dL Inject 8 units if blood glucose is between 261-300 mg/dL Inject 10 units if blood glucose is between 301-350 mg/dL Call your physician if blood glucose is greater than 351 mg/dL, 5 each 3    aspirin 325 MG Oral Tab Take 1 tablet (325 mg total) by mouth daily. 30 tablet 1    clopidogrel 75 MG Oral Tab Take 1 tablet (75 mg total) by mouth daily. 30 tablet 1    amLODIPine 2.5 MG Oral Tab Take 1 tablet (2.5 mg total) by mouth in the morning and 1 tablet (2.5 mg total) before bedtime.      losartan 50 MG Oral Tab Take 1 tablet (50 mg total) by mouth daily.      meclizine 25 MG Oral Tab Take 1 tablet (25 mg total) by mouth in the morning and 1 tablet (25 mg total) before bedtime.      metoprolol succinate ER 50 MG Oral Tablet 24 Hr Take 1 tablet (50 mg total) by mouth daily.      atorvastatin 40 MG Oral Tab Take 1 tablet (40 mg total) by mouth nightly.       No current facility-administered medications for this visit.       HISTORY: reconciled and reviewed with patient  Past Medical History:    CAD (coronary artery disease)    Coronary atherosclerosis    Heart attack (HCC)    Hypertension      Past Surgical History:   Procedure Laterality Date    Cath bare metal stent (bms)      Total hip replacement        Family History   Problem Relation Age of Onset    Stroke Mother     Dementia Mother     Diabetes Maternal Grandfather     Diabetes Daughter         type 1      Social History     Socioeconomic History    Marital status:    Tobacco Use    Smoking status: Former     Current packs/day: 0.00     Types: Cigarettes     Quit date: 9/27/2013     Years since quitting: 10.8    Smokeless tobacco: Never   Substance and Sexual Activity    Alcohol use: Not Currently     Comment: occasionally    Drug use: Not Currently     Social Determinants of Health     Food Insecurity: No Food Insecurity (7/15/2024)    Food Insecurity     Food Insecurity: Never true   Transportation Needs: No Transportation Needs (7/15/2024)     Transportation Needs     Lack of Transportation: No   Housing Stability: Low Risk  (7/15/2024)    Housing Stability     Housing Instability: No        Imaging & Consults:        Lab Results   Component Value Date/Time    WBC 8.1 07/17/2024 08:30 AM    HGB 12.5 (L) 07/17/2024 08:30 AM    HCT 34.5 (L) 07/17/2024 08:30 AM    .0 07/17/2024 08:30 AM     (H) 07/17/2024 08:30 AM     07/17/2024 08:30 AM    K 4.1 07/17/2024 08:30 AM     07/17/2024 08:30 AM    CO2 27.0 07/17/2024 08:30 AM    BUN 15 07/17/2024 08:30 AM    CREATSERUM 0.97 07/17/2024 08:30 AM    CA 8.9 07/17/2024 08:30 AM    ALB 3.6 07/15/2024 03:37 PM    ALT 30 07/15/2024 03:37 PM    AST 17 07/15/2024 03:37 PM    INR 0.90 07/15/2024 03:37 PM    A1C 10.3 (H) 07/15/2024 03:37 PM         There is no immunization history on file for this patient.    ROS:  GENERAL: energy fair/stable, denies fever, + improved left-sided weakness  SKIN: denies any skin changes, + incision to chest with Steri-Strips D/I  EYES: denies blurred vision, eye pain, + mild left facial droop/eye droop  HEENT: denies ear pain, loss of hearing, sore throat, + reports weak voice  RESPIRATORY: denies cough, denies dyspnea with exertion  CARDIOVASCULAR: denies syncope, chest pain, fatigue, palpitations   GI: denies abdominal pain, melena, constipation, diarrhea, + nausea, denies vomiting   MUSCULOSKELETAL: denies pain, states normal range of motion of extremities  NEUROLOGIC: denies confusion, + balance difficulty, + leaning to the left  PSYCHIATRIC: denies depression or anxiety  HEMATOLOGIC: + Mild anemia, denies bruising, bleeding    PHYSICAL EXAM:  Vitals:    07/25/24 1343   BP: 141/77   Pulse: 64   Resp: 18   Temp: 97.4 °F (36.3 °C)       GENERAL: well developed, well nourished, in no apparent distress, patient and wife are good historians  INTEGUMENTARY: warm, dry, no rashes, + incision to chest with Steri-Strips D/I  HEENT: atraumatic, normocephalic, sclera  white, conjunctivae pink  NECK: supple  CHEST: no chest tenderness  LUNGS: clear to auscultation bilaterally, no wheezes, rhonchi or rales, normal respiratory effort  CARDIO: S1, S2, RRR without audible murmur  GI: + BS to all quadrants, no tenderness  MUSCULOSKELETAL: + ROM in all joints, no evidence of swelling, pain, + leaning to the left  EXTREMITIES: no cyanosis, or edema  NEURO: Oriented x3  PSYCHIATRIC: appropriate affect    ASSESSMENT/ PLAN:   1. Cerebrovascular accident (CVA) due to embolism of cerebral artery//gait instability  Found to have on imaging-MRI to have multiple acute and subacute CVAs  No signs of arrhythmia but concern for underlying  Had Linq laced prior to discharge  Reports occasional nausea at no specific times/denies vomiting  Is having balance difficulties-using cane  Still leaning to the left  Mild left mouth/eye droop  Denies aphasia  Reports improving left-sided weakness  Reports weak voice  Linq site with incision to chest with Steri-Strips D/I and LUL  Noted with regular rhythm at today's exam  Started on:  Plavix 75 mg daily  Change:  Aspirin 325 mg daily  Discussed to stop meclizine for dizziness  Tolerating an oral diet  Appetite is good/drinking well  Moving bowels/passing gas  Written orders provided for:  Physical Therapy Referral - External  Occupational Therapy Referral - External  Speech Therapy Referral - External  Patient is currently doing therapy at [facility] PT/OT dates already scheduled-7/29, 7/30, 8/5, 8/9, 8/12, 8/19, 8/21, 8/26, 8/29  Ordered speech therapy  Follow-up with neurology Dr. Armijo on 8/28 at 11 AM  Follow-up with cardiology Dr. Wilson on 9/23  Saw device clinic this week per wife  Had DM education on 7/24 at 11 AM  Follow-up with EP Dr. Arriola in September  Establish with endocrinology Gypsy GALLARDO on 7/29  Requesting to establish with Dr. Chavarria for PCP-9/17 at 1 PM  All hospital records, labs, radiology from current hospitalization  reviewed    2. Type 2 diabetes mellitus with hyperglycemia, without long-term current use of insulin (HCC)  New onset DMII with A1c of 10.3% in the hospital  Had DM education on 7/24  Currently checking BS 4 times daily and should continue and keep log and bring with to all follow-ups for review  May benefit from CGM use-defer to endocrinology  Rx sent for:  Microlet Lancets Does not apply Misc; Test blood sugar four times daily before meals and at bedtime.  (Please make sure they are the purple lancets for the Micorlet device)  Dispense: 100 each; Refill: 0  Insulin Pen Needle (NOVOFINE AUTOCOVER PEN NEEDLE) 30G X 8 MM Does not apply Misc; Use to inject insulin up to 4 times per day  Dispense: 100 each; Refill: 0  insulin glargine (LANTUS SOLOSTAR) 100 UNIT/ML Subcutaneous Solution Pen-injector; Inject 27 Units into the skin nightly.  BS this morning was 176  BS log reviewed and running in morning 176-275, before lunch 225-239, before dinner 277, before bed 193  Goals discussed:  A1c of 6.7-7.0%  Fasting BS of   Premeal  or less  2 hours postprandial  or less  Diabetic preventative care discussed:  Yearly diabetic dilated eye exam- monitor for retinopathy  Dental exams every 6 months  Yearly urine for microalbumin-May need ACE/ARB in the future-losartan  Will need statin addition-atorvastatin  Daily thorough foot exams-tops, bottoms, in between toes-use a mirror-monitor for any wounds, cuts, calluses and report immediately  Do not cut anything off feet  Toenail care- cut toenails straight across and file down sides to reduce the risk of ingrown toenails  All ingrown toenails to be removed by podiatry  Discharged home on:  NovoLog 3 times daily before meals per sliding scale  Increased:  Lantus 27 units nightly  Continue:  Atorvastatin 40 mg nightly  Losartan 50 mg daily  Continue to monitor for S/S of hyper hypoglycemia  Establish with endocrinology on 7/29 for further management    3. Primary  hypertension  BP was stable but slightly elevated at today's exam at 141/77  Patient currently checks BP 3 times daily and should continue and keep log and bring with to all follow-ups for review  Continue:  Amlodipine 2.5 mg twice daily  Losartan 50 mg daily  Metoprolol 50 mg daily  Continue to monitor for S/S of hyper hypotension  Follow-up with cardiology as directed    4.  Dyslipidemia  Lipid panel checked in the hospital  Total cholesterol 186  HDL 55  Triglycerides 202  LDL 97  Pharmacist discussed lipid panel with patient and spouse  Discussed goal LDL should be less than 100 and ideally less than 70 and with comorbidity of diabetes should be less than 55 to prevent cardiovascular and cerebrovascular disease  Continue:  Atorvastatin 40 mg nightly  Further lipid panel monitoring per PCP/cardiology      Orders Placed This Encounter    DISCONTD: Glucose Blood (ONETOUCH VERIO) In Vitro Strip     Sig: Test blood sugar four times daily, before meals and at bedtime     Dispense:  100 strip     Refill:  0    Microlet Lancets Does not apply Misc     Sig: Test blood sugar four times daily before meals and at bedtime.  (Please make sure they are the purple lancets for the Micorlet device)     Dispense:  100 each     Refill:  0     Patient has Microlet lancing device and needs 29g or 30g needles if possible.    Insulin Pen Needle (NOVOFINE AUTOCOVER PEN NEEDLE) 30G X 8 MM Does not apply Misc     Sig: Use to inject insulin up to 4 times per day     Dispense:  100 each     Refill:  0     Patient does not want the BD Pen Needles if possible    insulin glargine (LANTUS SOLOSTAR) 100 UNIT/ML Subcutaneous Solution Pen-injector     Sig: Inject 27 Units into the skin nightly.     May substitute if not on insurance formulary. Please disregard previous order for 18 U           Medications & Refills for this Visit:   [DISCONTINUED] Glucose Blood (ONETOUCH VERIO) In Vitro Strip Test blood sugar four times daily, before meals and at  bedtime 100 strip 0    Microlet Lancets Does not apply Misc Test blood sugar four times daily before meals and at bedtime.  (Please make sure they are the purple lancets for the Micorlet device) 100 each 0    Insulin Pen Needle (NOVOFINE AUTOCOVER PEN NEEDLE) 30G X 8 MM Does not apply Misc Use to inject insulin up to 4 times per day 100 each 0    insulin glargine (LANTUS SOLOSTAR) 100 UNIT/ML Subcutaneous Solution Pen-injector Inject 27 Units into the skin nightly.      insulin aspart (NOVOLOG FLEXPEN) 100 Units/mL Subcutaneous Solution Pen-injector Test blood glucose 4 times daily before meals Inject 2 unit if blood glucose is between 141-180 mg/dL Inject 3 units if blood glucose is between 181-220 mg/dL Inject 6 units if blood glucose is between 221-260 mg/dL Inject 8 units if blood glucose is between 261-300 mg/dL Inject 10 units if blood glucose is between 301-350 mg/dL Call your physician if blood glucose is greater than 351 mg/dL, 5 each 3    aspirin 325 MG Oral Tab Take 1 tablet (325 mg total) by mouth daily. 30 tablet 1    clopidogrel 75 MG Oral Tab Take 1 tablet (75 mg total) by mouth daily. 30 tablet 1    amLODIPine 2.5 MG Oral Tab Take 1 tablet (2.5 mg total) by mouth in the morning and 1 tablet (2.5 mg total) before bedtime.      losartan 50 MG Oral Tab Take 1 tablet (50 mg total) by mouth daily.      [] meclizine 25 MG Oral Tab Take 1 tablet (25 mg total) by mouth every morning. Stop Meclizine on 24      metoprolol succinate ER 50 MG Oral Tablet 24 Hr Take 1 tablet (50 mg total) by mouth daily.      atorvastatin 40 MG Oral Tab Take 1 tablet (40 mg total) by mouth nightly.       Requested Prescriptions     Signed Prescriptions Disp Refills    Microlet Lancets Does not apply Misc 100 each 0     Sig: Test blood sugar four times daily before meals and at bedtime.  (Please make sure they are the purple lancets for the Micorlet device)    Insulin Pen Needle (NOVOFINE AUTOCOVER PEN NEEDLE)  30G X 8 MM Does not apply Misc 100 each 0     Sig: Use to inject insulin up to 4 times per day         Health Maintenance:  Health Maintenance   Topic Date Due    Diabetes Care Foot Exam  Never done    Diabetes Care Dilated Eye Exam  Never done    Colorectal Cancer Screening  Never done    Diabetes Care: Microalb/Creat Ratio  Never done    Annual Physical  Never done    Pneumococcal Vaccine: 65+ Years (1 of 2 - PCV) Never done    PSA  Never done    Zoster Vaccines (1 of 2) Never done    COVID-19 Vaccine (3 - 2023-24 season) 09/01/2023    Influenza Vaccine (1) 10/01/2024    Diabetes Care A1C  10/15/2024    Diabetes Care: GFR  07/17/2025    Annual Depression Screening  Completed    Fall Risk Screening (Annual)  Completed       Chronic Care Management Referral: N/A      Transitional Care Management Certification:  During the visit, the following was completed:  Obtained and reviewed discharge summary, continuity of care documents, Hospitalist notes, Cardiology notes, Endocrinology Notes, cardiology notes, and discharge information   Reviewed Labs (CBC, CMP), CT radiology results, EKG, Echocardiogram, Operative reports: Cardiac, MRI, PT, PTT, lipid panel, Last A1c value was 10.3% done 7/15/2024., need for follow-up on pending tests, need for follow-up on diagnostic tests, and need for follow-up on treatments    Medication Reconciliation:  I am aware of an inpatient discharge within the last 30 days.  The discharge medication list has been reconciled with the patient's current medication list and reviewed by me.  See medication list for additions of new medication, and changes to current doses of medications and discontinued medications.        Discharge Disposition/Plan:     Future Appointments   Date Time Provider Department Center   8/28/2024 11:00 AM Mag Armijo MD [facility]   9/9/2024 10:15 AM Gypsy Russo APRN [facility]   9/17/2024  1:00 PM Jayesh Chavarria MD [facility] [facility]        1.   Transitional Care Clinic Visit: Next visit Discharged  2.  PCP Visit: 9/17/2023  3.  Chronic Care Management: N/A     BUZZ Carpenter, 7/25/2024  [facility]  [phone]

## 2024-07-25 NOTE — PROGRESS NOTES
TRANSITIONAL CARE CLINIC PHARMACIST MEDICATION RECONCILIATION        Tahir Kapoor MRN [id number]    4/15/1950 PCP No primary care provider on file.       Comments: Medication history completed by the Transitional Care Clinic Pharmacist with the patient and spouse in the office.     The following medication changes occurred while patient was hospitalized:  Medications Started:  BD Pen Needle - Use a new pen needle with each injection as directed  Clopidogrel 75mg tabs - 1 tablet daily  Lantus 100unit/ml Solostar  - Inject 22 units nightly  Novolog 100unit/ml FlexPen - Test blood sugar four times daily before meals and use sliding scale below  OneTouch Verio glucose meter and supplies - Test blood sugar daily    Medications Stopped:  Effient 10mg tabs  Ibuprofen 200mg tabs  Lisinopril 2.5mg tabs    Medications Changed:  Aspirin 325mg tabs - 1 tablet daily    Outpatient Encounter Medications as of 2024   Medication Sig    Glucose Blood (ONETOUCH VERIO) In Vitro Strip Test blood sugar four times daily, before meals and at bedtime    Microlet Lancets Does not apply Misc Test blood sugar four times daily before meals and at bedtime.  (Please make sure they are the purple lancets for the Micorlet device)    Insulin Pen Needle (NOVOFINE AUTOCOVER PEN NEEDLE) 30G X 8 MM Does not apply Misc Use to inject insulin up to 4 times per day    insulin glargine (LANTUS SOLOSTAR) 100 UNIT/ML Subcutaneous Solution Pen-injector Inject 27 Units into the skin nightly.    insulin aspart (NOVOLOG FLEXPEN) 100 Units/mL Subcutaneous Solution Pen-injector Test blood glucose 4 times daily before meals Inject 2 unit if blood glucose is between 141-180 mg/dL Inject 3 units if blood glucose is between 181-220 mg/dL Inject 6 units if blood glucose is between 221-260 mg/dL Inject 8 units if blood glucose is between 261-300 mg/dL Inject 10 units if blood glucose is between 301-350 mg/dL Call your physician if blood glucose is greater  than 351 mg/dL,    aspirin 325 MG Oral Tab Take 1 tablet (325 mg total) by mouth daily.    clopidogrel 75 MG Oral Tab Take 1 tablet (75 mg total) by mouth daily.    amLODIPine 2.5 MG Oral Tab Take 1 tablet (2.5 mg total) by mouth in the morning and 1 tablet (2.5 mg total) before bedtime.    losartan 50 MG Oral Tab Take 1 tablet (50 mg total) by mouth daily.    meclizine 25 MG Oral Tab Take 1 tablet (25 mg total) by mouth every morning. Stop Meclizine on 24    metoprolol succinate ER 50 MG Oral Tablet 24 Hr Take 1 tablet (50 mg total) by mouth daily.    atorvastatin 40 MG Oral Tab Take 1 tablet (40 mg total) by mouth nightly.     Medication Adherence Assessment:   Patient reports taking all new medications as prescribed.  Denies any signs of bleeding.  Reports some gait disturbances and nausea at times.  States that the nausea does go away quickly, but does feel like he is falling to the left.  Patient does have PT and OT set up starting next week.  Patient reports he is still taking the Meclizine tablets twice daily.  Recommended to stop the Meclizine due to the potential for regular use to cause gait disturbances and rewiring of the brain and its balance center.  Patient denies having any room spinning vertigo, but was started on the Meclizine when he complained of balance issue.  Recommended the patient take the Meclizine once a day for the next 2-3 days, then stop the medication.  Patient agreed.    Patient reports blood sugars yesterday were: 275, 239, 277 (before meals), and 193 at bedtime.  This morning the blood sugar was 176 before breakfast and 225 before lunch.  Patient did have the Step 1 diabetes education class yesterday and feels more confident on how to eat and manage his meals with 45-60g of carbs.  States his blood sugar last night and this morning were definitely lower than they have been.    SMBG 24 to 24  Fastin,275,274,215,247,275 mg/dl                 Prelunch:  256,216,215,263,246 mg/dl                                    Predinner: 236,226,251,187,184 mg/dl  Bedtime:272,251,189,243,275 mg/dl  With blood sugars still running in the 200's at this time would recommend increasing the Lantus insulin to 27units in the evening.  APRN agreed.  Patient concerned that he needs more strips due to only getting 25 strips for once daily testing, but having to test 3-4 times daily.  Patient also having issues with the Delica Lancet device, so was provided with the Microlet Lancing device with only a few lancets from the Diabetes educator.  Pended orders for Microlet lancets and OneTouch Verio Strips.  [facility] contacted this writer that the Microlet lancets are on backorder, and Medicare will only approve once daily testing.  Discussed with the pharmacist that Medicare will approve more than daily testing with insulin use, but the pharmacist stated that will not happen.  Paperwork for additional testing obtained by this writer on 7/26/24.  Will hold off filling out paperwork due to Endocrinology appointment next week.  They may start patient on a CGM instead.  This writer was able to obtain 100 Microlet Lancets 28g and 30 One Touch Verio Test Strips to get the patient to the Endocrinology appointment next week.  Spouse picked up items from the clinic on 7/26/24.    Reviewed all medications in detail with patient including dose, indication, timing of administration, monitoring parameters, and potential side effects of medications.   Patient confirmed understanding.     Thank you,    Mallory Ferguson, PharmD, 7/25/2024, 4:21 PM  [facility] Clinic

## 2024-07-29 ENCOUNTER — OFFICE VISIT (OUTPATIENT)
Facility: CLINIC | Age: 74
End: 2024-07-29
Payer: MEDICARE

## 2024-07-29 ENCOUNTER — LAB ENCOUNTER (OUTPATIENT)
Dept: LAB | Age: 74
End: 2024-07-29
Attending: NURSE PRACTITIONER
Payer: MEDICARE

## 2024-07-29 VITALS
SYSTOLIC BLOOD PRESSURE: 116 MMHG | RESPIRATION RATE: 18 BRPM | OXYGEN SATURATION: 99 % | HEIGHT: 68 IN | WEIGHT: 180 LBS | DIASTOLIC BLOOD PRESSURE: 68 MMHG | HEART RATE: 58 BPM | BODY MASS INDEX: 27.28 KG/M2

## 2024-07-29 DIAGNOSIS — Z79.4 TYPE 2 DIABETES MELLITUS WITH HYPERGLYCEMIA, WITH LONG-TERM CURRENT USE OF INSULIN (HCC): Primary | ICD-10-CM

## 2024-07-29 DIAGNOSIS — E78.5 HYPERLIPIDEMIA, UNSPECIFIED HYPERLIPIDEMIA TYPE: ICD-10-CM

## 2024-07-29 DIAGNOSIS — I10 PRIMARY HYPERTENSION: ICD-10-CM

## 2024-07-29 DIAGNOSIS — E11.65 TYPE 2 DIABETES MELLITUS WITH HYPERGLYCEMIA, WITH LONG-TERM CURRENT USE OF INSULIN (HCC): Primary | ICD-10-CM

## 2024-07-29 DIAGNOSIS — I63.9 CEREBROVASCULAR ACCIDENT (CVA), UNSPECIFIED MECHANISM (HCC): ICD-10-CM

## 2024-07-29 RX ORDER — ORAL SEMAGLUTIDE 7 MG/1
1 TABLET ORAL DAILY
Qty: 30 TABLET | Refills: 0 | Status: SHIPPED | OUTPATIENT
Start: 2024-08-26 | End: 2024-07-29

## 2024-07-29 RX ORDER — SEMAGLUTIDE 0.68 MG/ML
0.25 INJECTION, SOLUTION SUBCUTANEOUS WEEKLY
Qty: 3 ML | Refills: 0 | Status: SHIPPED | OUTPATIENT
Start: 2024-07-29 | End: 2024-08-28

## 2024-07-29 RX ORDER — KETOROLAC TROMETHAMINE 30 MG/ML
INJECTION, SOLUTION INTRAMUSCULAR; INTRAVENOUS
Qty: 1 EACH | Refills: 0 | Status: SHIPPED | OUTPATIENT
Start: 2024-07-29

## 2024-07-29 RX ORDER — ORAL SEMAGLUTIDE 3 MG/1
1 TABLET ORAL DAILY
Qty: 30 TABLET | Refills: 0 | Status: SHIPPED | OUTPATIENT
Start: 2024-07-29 | End: 2024-07-29

## 2024-07-29 RX ORDER — METFORMIN HYDROCHLORIDE 500 MG/1
500 TABLET, EXTENDED RELEASE ORAL 2 TIMES DAILY WITH MEALS
Qty: 180 TABLET | Refills: 0 | Status: SHIPPED | OUTPATIENT
Start: 2024-07-29 | End: 2024-10-27

## 2024-07-29 RX ORDER — DULAGLUTIDE 0.75 MG/.5ML
0.75 INJECTION, SOLUTION SUBCUTANEOUS WEEKLY
Qty: 2 ML | Refills: 0 | Status: SHIPPED | OUTPATIENT
Start: 2024-07-29 | End: 2024-07-29

## 2024-07-29 RX ORDER — BLOOD-GLUCOSE SENSOR
1 EACH MISCELLANEOUS
Qty: 6 EACH | Refills: 1 | Status: SHIPPED | OUTPATIENT
Start: 2024-07-29

## 2024-07-29 NOTE — PROGRESS NOTES
[facility]    Name: Tahir Kapoor    Date: 2024    Tahir Kapoor is a 74 year old male who presents for evaluation of T2DM management.     Chief complaint: New Patient (Patient diagnosed in hospital with DM.   Patient here to establish care.   Patient placed on insulin after hospitalization. )       Subjective:   Initial HPI consult in 2024  Pt was recently discharged from the hospital last 24 due to stroke  Per his wife, pt noted fatigue prior to hospitalization.     DM hx:  -Diagnosed with diabetes in July 15, 2024 while he was hospitalized due to stroke  -Family history- yes, MGF ad daughter      -Re: potential DM medication contraindication (if positive, checkbox selected):  [] History of pancreatitis- denies   [] Personal/fam hx of medullary thyroid cancer/MEN2 denies  [] History of recent/frequent UTI/yeast infxn- denies   [] Previous amputation related to diabetes- denies     -Presence of associated DM complications (if positive, checkbox selected):  [x] Macrovascular complications (CAD/CVA/PAD)- CVA  [] Neuropathy -denies   [] Retinopathy-denies  [] Nephropathy- denies  [x] HTN  [x] Hyperlipidemia  [x] Stroke/TIA- most recent  [] Gastroparesis- denies   Denies DKA     -Lifestyle: eats potato chips, bread  - Modifying factors:    DM meds at first office visit: Lantus 27 units at bedtime ( started 27 last Friday, was 22 units prior to that), Novolog 2-3 units TID    Blood Glucose log reviewed. Checking 4 times per day. Morning/fastin-27, episodes of hypoglycemia: No        Previously trialed/failed DM meds: denies     History/Other:    Allergies, PMH, SocHx and FHx reviewed and updated as appropriate in Epic on    Multiple Vitamin (MULTIVITAMINS OR) Take 1 tablet by mouth daily.      TURMERIC OR Take 1 tablet by mouth daily.      Continuous Glucose Sensor (FREESTYLE BRITTANY 3 SENSOR) Does not apply Misc 1 each every 14 (fourteen) days. 6 each 1    Continuous  Glucose  (FREESTYLE BRITTANY 3 READER) Does not apply Device Use it daily to check glucose 1 each 0    metFORMIN  MG Oral Tablet 24 Hr Take 1 tablet (500 mg total) by mouth 2 (two) times daily with meals. 180 tablet 0    semaglutide (OZEMPIC, 0.25 OR 0.5 MG/DOSE,) 2 MG/3ML Subcutaneous Solution Pen-injector Inject 0.25 mg into the skin once a week. 3 mL 0    Glucose Blood (ONETOUCH VERIO) In Vitro Strip Test blood sugar four times daily, before meals and at bedtime 100 strip 0    Microlet Lancets Does not apply Misc Test blood sugar four times daily before meals and at bedtime.  (Please make sure they are the purple lancets for the Micorlet device) 100 each 0    Insulin Pen Needle (NOVOFINE AUTOCOVER PEN NEEDLE) 30G X 8 MM Does not apply Misc Use to inject insulin up to 4 times per day 100 each 0    insulin glargine (LANTUS SOLOSTAR) 100 UNIT/ML Subcutaneous Solution Pen-injector Inject 27 Units into the skin nightly.      insulin aspart (NOVOLOG FLEXPEN) 100 Units/mL Subcutaneous Solution Pen-injector Test blood glucose 4 times daily before meals Inject 2 unit if blood glucose is between 141-180 mg/dL Inject 3 units if blood glucose is between 181-220 mg/dL Inject 6 units if blood glucose is between 221-260 mg/dL Inject 8 units if blood glucose is between 261-300 mg/dL Inject 10 units if blood glucose is between 301-350 mg/dL Call your physician if blood glucose is greater than 351 mg/dL, 5 each 3    aspirin 325 MG Oral Tab Take 1 tablet (325 mg total) by mouth daily. 30 tablet 1    clopidogrel 75 MG Oral Tab Take 1 tablet (75 mg total) by mouth daily. 30 tablet 1    amLODIPine 2.5 MG Oral Tab Take 1 tablet (2.5 mg total) by mouth in the morning and 1 tablet (2.5 mg total) before bedtime.      losartan 50 MG Oral Tab Take 1 tablet (50 mg total) by mouth daily.      metoprolol succinate ER 50 MG Oral Tablet 24 Hr Take 1 tablet (50 mg total) by mouth daily.      atorvastatin 40 MG Oral Tab Take 1 tablet  (40 mg total) by mouth nightly.       No Known Allergies  Current Outpatient Medications   Medication Sig Dispense Refill    Multiple Vitamin (MULTIVITAMINS OR) Take 1 tablet by mouth daily.      TURMERIC OR Take 1 tablet by mouth daily.      Continuous Glucose Sensor (FREESTYLE BRITTANY 3 SENSOR) Does not apply Misc 1 each every 14 (fourteen) days. 6 each 1    Continuous Glucose  (FREESTYLE BRITTANY 3 READER) Does not apply Device Use it daily to check glucose 1 each 0    metFORMIN  MG Oral Tablet 24 Hr Take 1 tablet (500 mg total) by mouth 2 (two) times daily with meals. 180 tablet 0    semaglutide (OZEMPIC, 0.25 OR 0.5 MG/DOSE,) 2 MG/3ML Subcutaneous Solution Pen-injector Inject 0.25 mg into the skin once a week. 3 mL 0    Glucose Blood (ONETOUCH VERIO) In Vitro Strip Test blood sugar four times daily, before meals and at bedtime 100 strip 0    Microlet Lancets Does not apply Misc Test blood sugar four times daily before meals and at bedtime.  (Please make sure they are the purple lancets for the Micorlet device) 100 each 0    Insulin Pen Needle (NOVOFINE AUTOCOVER PEN NEEDLE) 30G X 8 MM Does not apply Misc Use to inject insulin up to 4 times per day 100 each 0    insulin glargine (LANTUS SOLOSTAR) 100 UNIT/ML Subcutaneous Solution Pen-injector Inject 27 Units into the skin nightly.      insulin aspart (NOVOLOG FLEXPEN) 100 Units/mL Subcutaneous Solution Pen-injector Test blood glucose 4 times daily before meals Inject 2 unit if blood glucose is between 141-180 mg/dL Inject 3 units if blood glucose is between 181-220 mg/dL Inject 6 units if blood glucose is between 221-260 mg/dL Inject 8 units if blood glucose is between 261-300 mg/dL Inject 10 units if blood glucose is between 301-350 mg/dL Call your physician if blood glucose is greater than 351 mg/dL, 5 each 3    aspirin 325 MG Oral Tab Take 1 tablet (325 mg total) by mouth daily. 30 tablet 1    clopidogrel 75 MG Oral Tab Take 1 tablet (75 mg total) by  mouth daily. 30 tablet 1    amLODIPine 2.5 MG Oral Tab Take 1 tablet (2.5 mg total) by mouth in the morning and 1 tablet (2.5 mg total) before bedtime.      losartan 50 MG Oral Tab Take 1 tablet (50 mg total) by mouth daily.      metoprolol succinate ER 50 MG Oral Tablet 24 Hr Take 1 tablet (50 mg total) by mouth daily.      atorvastatin 40 MG Oral Tab Take 1 tablet (40 mg total) by mouth nightly.       Past Medical History:    CAD (coronary artery disease)    Coronary atherosclerosis    Heart attack (HCC)    Hypertension     Family History   Problem Relation Age of Onset    Stroke Mother     Dementia Mother     Diabetes Maternal Grandfather     Diabetes Daughter         type 1     Social history: Reviewed.      ROS/Exam    REVIEW OF SYSTEMS: Ten point review of systems has been performed and is otherwise negative and/or non-contributory, except as described above.     VITALS  Vitals:    07/29/24 1235   BP: 116/68   BP Location: Left arm   Patient Position: Sitting   Cuff Size: adult   Pulse: 58   Resp: 18   SpO2: 99%   Weight: 180 lb (81.6 kg)   Height: 5' 8\" (1.727 m)       Wt Readings from Last 6 Encounters:   07/29/24 180 lb (81.6 kg)   07/25/24 189 lb (85.7 kg)   07/15/24 197 lb 5 oz (89.5 kg)   11/26/13 202 lb 13.2 oz (92 kg)       PHYSICAL EXAM  CONSTITUTIONAL:  awake, alert, cooperative, no apparent distress, and appears stated age, Vss stable   PSYCH: normal affect  LUNGS: breathing comfortably  CARDIOVASCULAR:  regular rate   NECK:  no palpable thyroid nodules, no lymphadenopathy   Musculoskeletal:  Diabetes foot exam:   Good foot hygiene.   Bilateral barefoot skin diabetic exam: normal.  Visualized feet and the appearance : normal.  Bilateral monofilament/sensation of both feet is normal. Vibration to dorsum to the first toe perceived.   Bilateral 2+ pedal pulse pedal pulse exam       Labs/Imaging:    Lab Results   Component Value Date    CHOLEST 140 07/16/2024    CHOLEST 186 07/12/2024    TRIG 207 (H)  07/16/2024    TRIG 202 (H) 07/12/2024    HDL 43 07/16/2024    HDL 55 07/12/2024    LDL 63 07/16/2024    LDL 97 07/12/2024     No results found for: \"MICROALBCREA\"   Lab Results   Component Value Date    CREATSERUM 0.97 07/17/2024    CREATSERUM 1.03 07/15/2024    EGFRCR 81 07/17/2024    EGFRCR 76 07/15/2024     Lab Results   Component Value Date    AST 17 07/15/2024    AST 16 07/12/2024    ALT 30 07/15/2024    ALT 36 07/12/2024       Lab Results   Component Value Date    TSH 1.640 10/26/2021    TSH 2.190 10/24/2014         Overall glucose control:  Lab Results   Component Value Date    A1C 10.3 (H) 07/15/2024       Supplementary Documentation:   -Surveillance for Diabetes Complications & Risks  Foot exam/neuropathy: Last Foot Exam: 07/29/24  NL exam    Retinopathy screening: Last Dilated Eye Exam: 07/01/22  Eye Exam shows Diabetic Retinopathy?: No      Assessment & Plan:     ICD-10-CM    1. Type 2 diabetes mellitus with hyperglycemia, with long-term current use of insulin ([facility])  E11.65 Continuous Glucose Sensor (FREESTYLE BRITTANY 3 SENSOR) Does not apply Misc    Z79.4 Continuous Glucose  (FREESTYLE BRITTANY 3 READER) Does not apply Device     metFORMIN  MG Oral Tablet 24 Hr     semaglutide (OZEMPIC, 0.25 OR 0.5 MG/DOSE,) 2 MG/3ML Subcutaneous Solution Pen-injector     Assay, Thyroid Stim Hormone     DISCONTINUED: Semaglutide (RYBELSUS) 3 MG Oral Tab     DISCONTINUED: Semaglutide (RYBELSUS) 7 MG Oral Tab      2. Primary hypertension  I10       3. Hyperlipidemia, unspecified hyperlipidemia type  E78.5       4. BMI 27.0-27.9,adult  Z68.27           Tahir Kapoor is a pleasant 74 year old male here for evaluation of:    #Diabetes- PMHx of Type 2 diabetes mellitus diagnosed last 7/17/24 after he was dx w/ CVA.  A1c above goal, ordered tsh, adding GLP1 agonist, multiple GLP1 agonist are not covered by insurance, if cannot avail, may try to apply to PAF will discuss on next visit. Discussed symptoms and  treatment of hypoglycemia. Might consider pioglitazone/actos given hx of CVA if cannot avail GLP1 agonist    Last A1c value was 10.3% done 7/15/2024.  Goal <7%. Importance of better glucose control in preventing onset/progression of end-organ damage discussed, as well as goals of therapy and clinical significance of A1C.     - med changes:  - If you cannot get any GLP1 agonist for now, continue Lantus 27 units once a day and follow current sliding scale of 141-180- 3 units   - once you get your GLP1 agonist( ozempic, rybelsus, trulicity), call us and I will give a new sliding scale.   - start Metformin 500 mg once a day for 1 week, then increase it to 1000 mg once a day in week 2  - Continue checking glucose 4x a day  - start CGM: rosa m  - see ophthalmology in 2-3 months   - start [employer] Rosa M 3 CGM with phone (connected to clinic profile)  - patient is on insulin, and has suboptimal glycemic control including wide glycemic swings, thus would benefit from CGM  - continue to check BG 3x/day using glucometer or CGM  - meet with endo DM educator re:CGM teaching phone call check in, 3-4 weeks after GLP1a start; plan for CDCES to obtain current BG, meds, tolerance of new GLP1a, if tolerating will adjust dose  - Discussed adding GLP-1 to current medication regimen, including action, risk vs benefit, dosing, and potential side effects. Patient denies hx of pancreatitis, gastroparesis, or personal or family hx of medullary thyroid CA or MEN 2.     -See above header \"Supplementary Documentation\" for surveillance for diabetes complications & risks  - given optho list to see for retina check     #Nephropathy screening: Will order UCMR when A1c lowers, on losartan now.   Lab Results   Component Value Date    EGFRCR 81 07/17/2024      #Blood pressure control: SBP is to goal <130 per PCP  BP Readings from Last 1 Encounters:   07/29/24 116/68   BP Meds: amLODIPine Tabs - 2.5 MG; losartan Tabs - 50 MG; metoprolol succinate ER  Tb24 - 50 MG         #CVA  - per PCP, on ASA and clopidogrel and metoprolol    #Hyperlipidemia/Lipids: LDL is to goal, per PCP   Lab Results   Component Value Date    LDL 63 07/16/2024    TRIG 207 (H) 07/16/2024   Cholesterol Meds: atorvastatin Tabs - 40 MG      #BMI 27  Balanced diet: carbohydrates, protein, healthy fats and fiber in each meal. Exercise of at least 150 mins each week. Decrease your simple carbohydrates intake such as sweets: cookies, soda, candy, white rice, white pasta, syrups    Return in about 6 weeks (around 9/9/2024).  Total time spent  45  minutes today on obtaining history, reviewing pertinent labs, evaluating patient, providing multiple treatment options, reinforcing diet/exercise and compliance, and completing documentation, of which greater than 50% was spent in face to face discussion with the patient       Case discussed with patient and his wife who demonstrated understanding and agreement with plan.     Thank you for allowing me to participate in the care of this patient.  Please feel free to contact me with any questions.    BUZZ Gómez, [facility]  Endocrinology, Diabetes & Metabolism   07/29/24      Note to patient: The 21 Century Cures Act makes medical notes like these available to patients in the interest of transparency. However, be advised this is a medical document. It is intended as peer to peer communication. It is written in medical language and may contain abbreviations or verbiage that are unfamiliar. It may appear blunt or direct. Medical documents are intended to carry relevant information, facts as evident, and the clinical opinion of the practitioner.

## 2024-07-29 NOTE — PATIENT INSTRUCTIONS
Follow up plan:  - With SAMI Emery: Return in about 6 weeks (around 9/9/2024).  [ x ] In person or video visit  [ x ] After visit summary   [  x] Diabetes Education:      [x ] CGM start / phone call for titration of insulin/DM meds_____________ (30)           - Discharge Instruction:  - get tsh blood test  - If you cannot get any GLP1 agonist for now, continue Lantus 27 units once a day and follow current sliding scale of 141-180- 3 units   - once you get your GLP1 agonist( ozempic, rybelsus, trulicity), call us and I will give a new sliding scale.   - start Metformin 500 mg once a day for 1 week, then increase it to 1000 mg once a day in week 2  - Continue checking glucose 4x a day  - start CGM: darlin  - see ophthalmology in 2-3 months     Ophthalmologist for diabetes eye exam:  [facility]: Dr Arianne Tse- [address] [facility] [address], [place] [address]- PHONE: [phone]  [facility]: [phone]  Dr. Oppenheim in [place]- [phone]  [facility]: Phone: [phone], Address: [address] [place] [address]  [facility]- [phone], [facility]- [phone]- [place], [facility], or [facility]      It was great seeing you today!  - meet with Helena (our diabetes educator) in 1-7 days for:   CGM start  And also 2-3 weeks after that for insulin titration  and for carb awareness  - Visits with Helena are considered a 'nurse visit' and are an extension of your services you receive here. The visit will appear on your Mychart as a scheduled visit so you know when she is going to call you or meet with you. If you need to reschedule, please call and cancel to avoid a no show fee. If you do not show up to your appointment or miss her call within 15 minutes of the visit, you will be assessed a $40 late fee.        - Blood sugar targets:  Before breakfast: , 2 hours after meals: <180 (preferably <150), A1C goal:  <7.0%  Time in Range goal is higher than 80% if using a continuous glucose monitor (Dexcom or Rosa M).  Time in Range can be found within the Dexcom G7 tiffany, on Clarity if using Dexcom G6, or on LibreView if using Rosa M.    - Follow Balanced diet: carbohydrates, protein, healthy fats and fiber in each meal. Exercise of at least 150 mins each week. Decrease your simple carbohydrates intake such as sweets: cookies, soda, candy, white rice, white pasta, syrups    - Decrease saturated fat, trans fat, and cholesterol intake  example: Butter, lard, shortening, coconut, coconut oil, palm oil, fried food, bologna,pepperoni, salami, egg yolks, Poultry skin, visible meat fat.      HOW TO TREAT LOW BLOOD SUGAR (Hypoglycemia)  Low blood sugar= Less than 70, or if you start to have symptoms (below)  Symptoms: Shaking or trembling, fast heart rate, extreme hunger, sweating, confusion/difficulty concentrating, dizziness.    How to treat a low blood sugar if you are able to eat/drink: The Rule of 15  If you are using continuous glucose monitor that says you are low, but you do not have any symptoms, verify on fingerstick that your blood sugar is actually low before treating.   Eat 15 grams of carbs (see examples below)  Check your blood sugar after 15 minutes. If it’s still below your target range, have another serving.   Repeat these steps until it’s in your target range. Once it’s in range, if you're nervous about your sugar going low again, have a protein source (ie, a spoonful of peanut butter).   If you have a CGM you want to look for how your arrow has changed. If you arrow is pointed up or sideways after 15 min, give your CGM more time OR check with a finger stick. Try not to eat more food until at least 15 min after the first BG check - otherwise you risk having a rebound high.  If you are experiencing symptoms and you are unable to check your blood glucose for any reason, treat the hypoglycemia.  If someone has a low blood  sugar and is unconscious: Don’t hesitate to call 911. If someone is unconscious and glucagon is not available or someone does not know how to use it, call 911 immediately.    To treat a low, I recommend you carry with you easy, pre-portioned treatment for low blood sugars that are 15G of carbs:   - Children sized squeeze pouch applesauce (high fiber + carbs help prevent too high of a spike)  - Small children's sized juicebox- 15g carb --> 4oz juice box  - Glucose tablets from [employer] you can find them near diabetes supplies --> Note, you will need to eat 3-4 tablets to get to 15g of carbs  - Children sized fruit snack pack- look for one with 15 grams of total carbohydrate  - Choice of how to treat your low is important. Complex carbs, or foods that contain fats along with carbs (like chocolate) can slow the absorption of glucose and should NOT be used to treat an emergency low    Thank you for visiting our office. We look forward to working with you to reach your health goals. As a reminder, if you need refills, please request early so there is enough time to process the request. We ask that you provide at least 5 days' notice before a refill is due, so there is time to send a request to pharmacy, process the refill, and ensure there are no other problems with obtaining the medication (backorders, prior authorization paperwork, etc). Routine refills will not be addressed on weekends, so please submit these requests during the week.

## 2024-07-30 ENCOUNTER — TELEPHONE (OUTPATIENT)
Facility: CLINIC | Age: 74
End: 2024-07-30

## 2024-07-30 DIAGNOSIS — E11.65 TYPE 2 DIABETES MELLITUS WITH HYPERGLYCEMIA, WITHOUT LONG-TERM CURRENT USE OF INSULIN (HCC): ICD-10-CM

## 2024-07-30 DIAGNOSIS — Z79.4 TYPE 2 DIABETES MELLITUS WITH HYPERGLYCEMIA, WITH LONG-TERM CURRENT USE OF INSULIN (HCC): ICD-10-CM

## 2024-07-30 DIAGNOSIS — E11.65 TYPE 2 DIABETES MELLITUS WITH HYPERGLYCEMIA, WITH LONG-TERM CURRENT USE OF INSULIN (HCC): ICD-10-CM

## 2024-07-30 RX ORDER — BLOOD SUGAR DIAGNOSTIC
STRIP MISCELLANEOUS
Qty: 100 STRIP | Refills: 0 | Status: SHIPPED | OUTPATIENT
Start: 2024-07-30 | End: 2024-07-30

## 2024-07-30 RX ORDER — BLOOD SUGAR DIAGNOSTIC
STRIP MISCELLANEOUS
Qty: 400 STRIP | Refills: 1 | Status: SHIPPED | OUTPATIENT
Start: 2024-07-30 | End: 2024-07-31

## 2024-07-30 RX ORDER — ORAL SEMAGLUTIDE 3 MG/1
1 TABLET ORAL DAILY
Qty: 30 TABLET | Refills: 0 | Status: SHIPPED | OUTPATIENT
Start: 2024-07-30 | End: 2024-07-30

## 2024-07-30 RX ORDER — SEMAGLUTIDE 0.68 MG/ML
0.25 INJECTION, SOLUTION SUBCUTANEOUS WEEKLY
Qty: 3 ML | Refills: 0 | Status: SHIPPED | OUTPATIENT
Start: 2024-07-30 | End: 2024-07-31

## 2024-07-30 NOTE — TELEPHONE ENCOUNTER
Trying to meet the deductible - would like to cancel Rybelsus 3 mg and send for Ozempic low dose    Test strips Needs more information: Pharmacy states they need more information from provider    RN phoned pharmacy and confirmed they will send a fax with what is needed    Reviewed that Rybelsus was canceled as well    Pended Rx and routed for review.     Will await fax to office.

## 2024-07-30 NOTE — TELEPHONE ENCOUNTER
I approved the test strips and rybelsus. I left a vm to pt that for this month, rybelsus (semaglutide) 3 mg daily and then next month will start w/ ozempic (semaglutide) 0.5 mg weekly and no more rybelsus at that time. Titrating the dose of semaglutide next month.

## 2024-07-30 NOTE — TELEPHONE ENCOUNTER
LOV: 07/29/2024     Next office visit: 09/09/2024     Last filled: not filled with us     Pt wants to know if he can go back on the rybelsus (one month supply) they will be able to meet deductible and then next month start on the Ozempic so it will be cheaper for them. If its appropriate its pending for approval.      Order pended and routed to provider

## 2024-07-30 NOTE — TELEPHONE ENCOUNTER
Placed call to patient to ensure understands what is needed for CGM start appointment scheduled for tomorrow at 11am.  Left voice mail and sent follow up my chart message, noted on visit.

## 2024-07-31 NOTE — TELEPHONE ENCOUNTER
Phoned [facility] pharmacy to confirm- they have not dispensed Rosa M 3- they are not able to process CGM- they recommend this to go to [facility]. Phoned patient, no answer, left message to call back to discuss per BHUPENDRA- would he be ok with send this rx through a local [facility]?     For the test strips- they will need Medical Necessity Form- Medicare covers testing 3x daily for patients using insulin- if more frequent testing- reason needs to be given.    Form placed in [facility] inbox for review/sign off.

## 2024-07-31 NOTE — TELEPHONE ENCOUNTER
7/31/24-Received via fax from [facility] a Medical Necessity form for High Utilization for One Touch Verio In Vitro Strip.  Placed in PA in basket.

## 2024-07-31 NOTE — TELEPHONE ENCOUNTER
Rn spoke with patients wife Becka, confirmed going through [facility]'s is ok.    Confirmed this will go to [facility] on [address] in [place].    Pended prescriptions to this pharmacy and routed for review.

## 2024-08-01 ENCOUNTER — TELEPHONE (OUTPATIENT)
Facility: CLINIC | Age: 74
End: 2024-08-01

## 2024-08-01 ENCOUNTER — TELEPHONE (OUTPATIENT)
Dept: INTERNAL MEDICINE CLINIC | Facility: CLINIC | Age: 74
End: 2024-08-01

## 2024-08-01 RX ORDER — SEMAGLUTIDE 0.68 MG/ML
0.25 INJECTION, SOLUTION SUBCUTANEOUS WEEKLY
Qty: 3 ML | Refills: 0 | Status: SHIPPED | OUTPATIENT
Start: 2024-08-01

## 2024-08-01 RX ORDER — BLOOD-GLUCOSE SENSOR
1 EACH MISCELLANEOUS
Qty: 6 EACH | Refills: 1 | Status: SHIPPED | OUTPATIENT
Start: 2024-08-01 | End: 2024-08-05

## 2024-08-01 RX ORDER — BLOOD SUGAR DIAGNOSTIC
STRIP MISCELLANEOUS
Qty: 300 STRIP | Refills: 1 | Status: SHIPPED | OUTPATIENT
Start: 2024-08-01 | End: 2025-07-31

## 2024-08-01 RX ORDER — KETOROLAC TROMETHAMINE 30 MG/ML
INJECTION, SOLUTION INTRAMUSCULAR; INTRAVENOUS
Qty: 1 EACH | Refills: 0 | Status: SHIPPED | OUTPATIENT
Start: 2024-08-01 | End: 2024-08-05

## 2024-08-01 NOTE — TELEPHONE ENCOUNTER
Pt's wife Becka called. She states she needs to reschedule pt's 8/5/24 HFU appt. She states pt has PT/OT/ST appts and follow ups with specialists and she cannot make it to an appt with MK until September. OV rescheduled for 9/17/24.     MK - would you like to keep appt as HFU or do you want it to be changed to new pt appt?

## 2024-08-01 NOTE — PROGRESS NOTES
Multiple attempts to reach patient and messages left with no return call.  Patient went in for hospital follow-up appointment with transitional care clinic on 7/25/24.  Encounter closing.

## 2024-08-01 NOTE — TELEPHONE ENCOUNTER
8/1/24-Received via fax from [facility] a PA for Apollidon 3 reader.  Placed in PA in basket.

## 2024-08-01 NOTE — TELEPHONE ENCOUNTER
Received call from patient's wife- states only able to  30 day supply of testing strips- were told would interfere with cgm covered    State was told by [facility] that they are not able to distribute Ozempic with patiens insurance plan.   [employer] prescription part D- usually [facility], patient is going to phone their insurance and update us on this.    States Rosa M will need PA- unable to do through CMM    Spoke with Malik DM ed- will need to go through DME

## 2024-08-02 ENCOUNTER — TELEPHONE (OUTPATIENT)
Facility: CLINIC | Age: 74
End: 2024-08-02

## 2024-08-02 NOTE — TELEPHONE ENCOUNTER
Spoke with pharmacy, Krista, Pharmacist who states that patients wife said patient was needing for 4 times for testing    RN reviewed that this was discussed with them- insurance wanting to pay for only 3x testing. This was through [employer] however,    Krista ran for [employer]- was able to do 400 for 90 days. Reviewed Script could be updated with verbal.

## 2024-08-02 NOTE — TELEPHONE ENCOUNTER
8/2/24-Received via fax from [facility] a PA for [employer] Rosa M 3 Sensor.  Placed in PA in basket.

## 2024-08-04 PROBLEM — E78.5 DYSLIPIDEMIA: Status: ACTIVE | Noted: 2024-08-04

## 2024-08-05 ENCOUNTER — TELEPHONE (OUTPATIENT)
Facility: CLINIC | Age: 74
End: 2024-08-05

## 2024-08-05 NOTE — TELEPHONE ENCOUNTER
Ozempic 0.25 mg x 4 weeks, then increase to 0.5  mg weekly thereafter   continue Lantus 27 units once a day   Check your blood sugar before you eat. Based on the number, give yourself a dose based on sliding scale below 15-20 minutes before the meal:   Glucose 181-220- 3 units   221-260-5 units   261-300- 7 units   301-340- 9 units   341-380- 11 units   381-420- 13 units   421-460- 15 units   - Metformin 1000 mg once a day.   To message us if any persistent glucose above 250 or glucose below 80 twice.   Message me in 2 weeks with this treatment and will adjust if needed     Placed in [facility] and sent to patient.    Office notes faxed with [facility] to [facility] at [phone].    Will await confirmation

## 2024-08-05 NOTE — TELEPHONE ENCOUNTER
Received fax from [facility] for Rosa M supplies    Filled out form and placed in inbox for signing

## 2024-08-05 NOTE — TELEPHONE ENCOUNTER
RN phoned patient's wife Becka- Ozempic went to [facility] and they were able to pick this up, patient has not started- next Rx can be [facility] or [facility]    Per last office note- they will need a new sliding scale with Ozempic start    For CGM- this will need to go through [facility]- patient already signed up.    [facility]  Fax: [phone]  Phone: [phone]    [facility]- [phone]    RN phoned [facility]- confirmed that prescriptions have been canceled for Rosa M 3 sensors and reader.

## 2024-08-08 NOTE — TELEPHONE ENCOUNTER
Received call from patient's wife Becka - reviewed new sliding scale    Becka states that darlin should be shipped to them tomorrow 8/9    They have apt with Helena WU ed on 8/12 then follow up with Gypsy on 9/9    Closing Encounter.

## 2024-08-09 ENCOUNTER — NURSE ONLY (OUTPATIENT)
Facility: CLINIC | Age: 74
End: 2024-08-09
Payer: MEDICARE

## 2024-08-09 ENCOUNTER — LAB ENCOUNTER (OUTPATIENT)
Dept: LAB | Age: 74
End: 2024-08-09
Attending: NURSE PRACTITIONER
Payer: MEDICARE

## 2024-08-09 DIAGNOSIS — Z79.4 TYPE 2 DIABETES MELLITUS WITH HYPERGLYCEMIA, WITH LONG-TERM CURRENT USE OF INSULIN (HCC): ICD-10-CM

## 2024-08-09 DIAGNOSIS — E11.65 TYPE 2 DIABETES MELLITUS WITH HYPERGLYCEMIA, WITH LONG-TERM CURRENT USE OF INSULIN (HCC): ICD-10-CM

## 2024-08-09 DIAGNOSIS — E11.65 TYPE 2 DIABETES MELLITUS WITH HYPERGLYCEMIA, WITHOUT LONG-TERM CURRENT USE OF INSULIN (HCC): Primary | ICD-10-CM

## 2024-08-09 LAB — TSI SER-ACNC: 1.94 MIU/ML (ref 0.55–4.78)

## 2024-08-09 PROCEDURE — 84443 ASSAY THYROID STIM HORMONE: CPT

## 2024-08-09 PROCEDURE — 99211 OFF/OP EST MAY X REQ PHY/QHP: CPT | Performed by: NURSE PRACTITIONER

## 2024-08-09 PROCEDURE — 36415 COLL VENOUS BLD VENIPUNCTURE: CPT

## 2024-08-09 NOTE — PROGRESS NOTES
Rosa M 3 Start    Tahir Kapoor  4/15/1950    Referred by: SAMI Emery       Patient seen in clinic to start on new Rosa M device.  Patient given verbal instructions and demonstrated and completed teach back in the correct order before placing first sensor on back of  the arm.    Reviewed the following topics:    Functions of the sensor, phone/reader, and overlap patches   Reviewed how to scan every 8 hours; warm up period of 1hr if using Rosa M 2  Test blood glucose if symptoms do not match sensor reading.  How to handle problems like MRI, CT scans, travel, etc.  Explained how to change enter sensor code q 14 days  Showed pt how to change high/low alert as well as to see when sensor expires.  Reviewed how to remove sensor in 14 days.  Reviewed sensor expiration alerts.  Instructed pt not to inject insulin within 3 inches of sensor  Instructed pt they will have to return to fingerstick BG testing temporarily if out of sensors/reader  Reviewed packing/supplies  Confirmed patient is getting supplies from [employer] via parachute      Rogers:  - Demonstrated scanning of reader  - Showed pt how to charge reader      Future Appointments   Date Time Provider Department Center   8/28/2024 11:00 AM Mag Armijo MD [facility]   9/9/2024 10:15 AM Gypsy Russo APRN [facility]   9/17/2024  1:00 PM Jayesh Chavarria MD [facility] [facility]        8/9/2024  Helena Miles RN, MSN, BC-ADM, [facility]  Diabetes Care &      A total of 40 minutes was spent with the patient including chart review, discussion and education / advisement pertinent to patient and provider specified concerns as documented above.     Note to patient: The 21 Century Cures Act makes medical notes like these available to patients in the interest of transparency. However, be advised this is a medical document. It is intended as peer to peer communication. It is written in medical language and may contain abbreviations or  verbiage that are unfamiliar. It may appear blunt or direct. Medical documents are intended to carry relevant information, facts as evident, and the clinical opinion of the practitioner.

## 2024-08-16 ENCOUNTER — TELEPHONE (OUTPATIENT)
Facility: CLINIC | Age: 74
End: 2024-08-16

## 2024-08-16 NOTE — TELEPHONE ENCOUNTER
Spoke with patient and wife together on speaker phone and reviewed below.    Verbalized understanding of all.    Asked to f/u with office if does note sugars <70, or if symptoms do not continue to improve/become intolerable.     Closing this encounter.

## 2024-08-16 NOTE — TELEPHONE ENCOUNTER
Received phone call from patient's wife.    States patient just started 2nd week of Ozempic, and last took 0.25mg dose on Wednesday 8/14.  Patient is \"still nauseous all the time\". Denies any vomiting or diarrhea. Still eating, but having to make himself, appetite is very low. Eating a very bland diet to try and help with symptoms. Also eating endy chews to try and help. States symptoms are better than last week.     Patient sees positive effect of Ozempic on blood glucose readings. States he has not used any short acting insulin, only his long acting insulin and Ozempic and readings have never been over 180. States he had one low reading a few nights ago during sleep of 69, woke from Rosa M alarm, was not symptomatic- had 1 glucose tab, and then ok. No other low readings.    Patient wants to continue Ozempic, but asking if anything else to help with nausea? Wants Gypsy's opinion on if she thinks nausea will improve?

## 2024-08-16 NOTE — TELEPHONE ENCOUNTER
I think nausea will improve, continue ozempic 0.25 mg.   If any persistent low glucose <70 cut back w. Lantus from 27 down to 24 units.     Additional options to to avoid side effect of nausea:    - Avoid fatty, fried food a day before injection and 2-3 days after the injection.   - Avoid certain foods: Avoid foods that are high in fat, sugar, or sodium, as well as spicy foods and refined carbs. You can also try to avoid dairy and fat in the evening to reduce the risk of nausea, heartburn, and belching.  - Do no lay down right away after eating.   - Eat small bland diet 2-3 days after the injection  - drink water, avoid sparkling drinks: soda.   - Eat smaller meals all through out the day rather than eating big meals in 1 sitting. Eat slowly: Eating slowly can help prevent stomach upset.  - Finish eating before bed: Give your body enough time to digest food before going to bed to reduce the risk of acid reflux and other side effects.  - Getting some fresh air when feeling nauseous  - continue using  endy chews to try and help.

## 2024-08-20 ENCOUNTER — TELEPHONE (OUTPATIENT)
Facility: CLINIC | Age: 74
End: 2024-08-20

## 2024-08-20 NOTE — TELEPHONE ENCOUNTER
Received CMN for patient testing supplies.    Filled out and placed in BUZZ Emery for review and signing.

## 2024-08-23 ENCOUNTER — NURSE ONLY (OUTPATIENT)
Facility: CLINIC | Age: 74
End: 2024-08-23
Payer: MEDICARE

## 2024-08-23 DIAGNOSIS — E11.65 TYPE 2 DIABETES MELLITUS WITH HYPERGLYCEMIA, WITHOUT LONG-TERM CURRENT USE OF INSULIN (HCC): Primary | ICD-10-CM

## 2024-08-23 PROCEDURE — 99211 OFF/OP EST MAY X REQ PHY/QHP: CPT | Performed by: NURSE PRACTITIONER

## 2024-08-23 NOTE — PROGRESS NOTES
Continuous Glucose Monitor Download - Rosa M Kapoor  4/15/1950    Referred by: SAMI Emery       Medical Background      Past Medical History:    CAD (coronary artery disease)    Coronary atherosclerosis    Heart attack (HCC)    Hypertension      Lab Results   Component Value Date    A1C 10.3 (H) 07/15/2024         Patient has T2DM, seen today regarding blood glucose levels and Ozempic start    Medication Review      DM Meds: Lantus SoloStar Sopn - 100 UNIT/ML; metFORMIN ER Tb24 - 500 MG; NovoLOG FlexPen Sopn - 100 UNIT/ML; Ozempic (0.25 or 0.5 MG/DOSE) Sopn - 2 MG/3ML        Patient States Currently Taking:   - Ozempic 0.25 mg weekly  - Long acting 27 u daily    Blood Glucose Review      Printed for review     Data for last 14 days --  Average Glucose: 124  0 % Very High  3 % High  97 % In Range  0% Low  0% Very Low       Interpretation of Data:  TIR to goal on current regimen    Patient Concerns      - Patient having significant side effects of nausea and stomach upset with ozempic start, states that symptoms are constant and have reduced slightly, but is still experiencing lack of interest in eating and sudden increase in nausea, not always related to food consumption.  Patient would like to continue trying the Ozempic, will try OTC anti emetics for nausea management       Recommendations / Plan      - None today from [facility], given to provider for review    Future Appointments   Date Time Provider Department Center   8/23/2024 11:15 AM EMG DIABETIC EDUCATOR ENDO [facility]   8/28/2024 11:00 AM Mag Armijo MD [facility]   9/9/2024 10:15 AM Gypsy Russo APRN [facility]   9/17/2024  1:00 PM Jayesh Chavarria MD [facility] [address] [facility]          8/23/2024  Helena Miles RN, MSN, BC-ADM, [facility]  Diabetes Care &      A total of 30 minutes was spent with the patient including chart review, discussion and education / advisement pertinent to patient and  provider specified concerns as documented above.     Note to patient: The 21 Century Cures Act makes medical notes like these available to patients in the interest of transparency. However, be advised this is a medical document. It is intended as peer to peer communication. It is written in medical language and may contain abbreviations or verbiage that are unfamiliar. It may appear blunt or direct. Medical documents are intended to carry relevant information, facts as evident, and the clinical opinion of the practitioner.

## 2024-08-26 ENCOUNTER — PATIENT MESSAGE (OUTPATIENT)
Facility: CLINIC | Age: 74
End: 2024-08-26

## 2024-08-26 NOTE — PROGRESS NOTES
Appreciate SANDI-RN recommendations, reviewed and agree.    Current treatment: DM Meds: Lantus SoloStar Sopn - 100 UNIT/ML; metFORMIN ER Tb24 - 500 MG; NovoLOG FlexPen Sopn - 100 UNIT/ML; Ozempic (0.25 or 0.5 MG/DOSE) Sopn - 2 MG/3ML    Patient States Currently Taking:   - Ozempic 0.25 mg weekly  - Long acting 27 u daily       Recommendation:  Continue  mg daily, if he can increase  it to 1000 mg, decrease Lantus from 27 to 20 units. If he cannot increase it to 1000 mg decrease lantus from 27 units to 23 units.   Continue ozempic 0.25 mg weekly for total of 4 weeks.     Gypsy Russo, APRN  8/26/2024

## 2024-08-28 ENCOUNTER — OFFICE VISIT (OUTPATIENT)
Dept: NEUROLOGY | Facility: CLINIC | Age: 74
End: 2024-08-28
Payer: MEDICARE

## 2024-08-28 VITALS
OXYGEN SATURATION: 97 % | HEART RATE: 67 BPM | BODY MASS INDEX: 30 KG/M2 | DIASTOLIC BLOOD PRESSURE: 82 MMHG | SYSTOLIC BLOOD PRESSURE: 120 MMHG | WEIGHT: 197 LBS

## 2024-08-28 DIAGNOSIS — I63.9 CEREBROVASCULAR ACCIDENT (CVA), UNSPECIFIED MECHANISM (HCC): Primary | ICD-10-CM

## 2024-08-28 PROCEDURE — 99215 OFFICE O/P EST HI 40 MIN: CPT | Performed by: OTHER

## 2024-08-28 RX ORDER — CLOPIDOGREL BISULFATE 75 MG/1
75 TABLET ORAL DAILY
Qty: 90 TABLET | Refills: 1 | Status: SHIPPED | OUTPATIENT
Start: 2024-08-28

## 2024-08-28 NOTE — PATIENT INSTRUCTIONS
Refill policies:    Allow 2-3 business days for refills; controlled substances may take longer.  Contact your pharmacy at least 5 days prior to running out of medication and have them send an electronic request or submit request through the “request refill” option in your [employer] account.  Refills are not addressed on weekends; covering physicians do not authorize routine medications on weekends.  No narcotics or controlled substances are refilled after noon on Fridays or by on call physicians.  By law, narcotics must be electronically prescribed.  A 30 day supply with no refills is the maximum allowed.  If your prescription is due for a refill, you may be due for a follow up appointment.  To best provide you care, patients receiving routine medications need to be seen at least once a year.  Patients receiving narcotic/controlled substance medications need to be seen at least once every 3 months.  In the event that your preferred pharmacy does not have the requested medication in stock (e.g. Backordered), it is your responsibility to find another pharmacy that has the requested medication available.  We will gladly send a new prescription to that pharmacy at your request.    Scheduling Tests:    If your physician has ordered radiology tests such as MRI or CT scans, please contact Central Scheduling at [phone] right away to schedule the test.  Once scheduled, the [facility] Centralized Referral Team will work with your insurance carrier to obtain pre-certification or prior authorization.  Depending on your insurance carrier, approval may take 3-10 days.  It is highly recommended patients assure they have received an authorization before having a test performed.  If test is done without insurance authorization, patient may be responsible for the entire amount billed.      Precertification and Prior Authorizations:  If your physician has recommended that you have a procedure or additional testing performed the [facility]  Centralized Referral Team will contact your insurance carrier to obtain pre-certification or prior authorization.    You are strongly encouraged to contact your insurance carrier to verify that your procedure/test has been approved and is a COVERED benefit.  Although the [facility] Centralized Referral Team does its due diligence, the insurance carrier gives the disclaimer that \"Although the procedure is authorized, this does not guarantee payment.\"    Ultimately the patient is responsible for payment.   Thank you for your understanding in this matter.  Paperwork Completion:  If you require FMLA or disability paperwork for your recovery, please make sure to either drop it off or have it faxed to our office at [phone]. Be sure the form has your name and date of birth on it.  The form will be faxed to our Forms Department and they will complete it for you.  There is a 25$ fee for all forms that need to be filled out.  Please be aware there is a 10-14 day turnaround time.  You will need to sign a release of information (BHUPENDRA) form if your paperwork does not come with one.  You may call the Forms Department with any questions at [phone].  Their fax number is [phone].

## 2024-08-28 NOTE — TELEPHONE ENCOUNTER
From: Tahir Kapoor  Sent: 8/28/2024 8:15 AM CDT  To: BUZZ Emery  Subject: Metformin    ----- Message from Helena Miles RN sent at 8/28/2024 8:15 AM CDT -----  Patient is currently taking 1000 mg Metformin (500 mg twice daily), the direction you had given in the note was:     Continue  mg daily, if he can increase it to 1000 mg, decrease Lantus from 27 to 20 units. If he cannot increase it to 1000 mg decrease lantus from 27 units to 23 units.   Continue ozempic 0.25 mg weekly for total of 4 weeks.     How would you like to adjust?    Thanks!     ----- Message from Tahir Kapoor to Helena Nieves RN sent at 8/27/2024 9:35 PM -----   Yoni Malik,  I'm actually taking metformin twice a day at 500mg each time along with the ozempic and insulin. Regards.    Gelacio Kapoor      ----- Message -----   From:Helena MARIA   Sent:8/26/2024 8:47 AM CDT   To:Tahir Kapoor   Subject:Metformin    Hi Tahir,    I wanted to reach out and confirm with you quickly, are you still taking the metformin 500 mg daily along with the Ozempic and your insulin?     Please let me know.      Thanks,  Helena Miles RN, MSN, [facility], [facility]  Diabetes Care &   [facility]   [phone] (office)  [phone] (fax)   Office Phone Hours  8:30 am - 4:30 pm, ARIEL

## 2024-08-28 NOTE — TELEPHONE ENCOUNTER
LOV 7/29/24 Plan at that time - med changes:  - If you cannot get any GLP1 agonist for now, continue Lantus 27 units once a day and follow current sliding scale of 141-180- 3 units   - once you get your GLP1 agonist( ozempic, rybelsus, trulicity), call us and I will give a new sliding scale.   - start Metformin 500 mg once a day for 1 week, then increase it to 1000 mg once a day in week 2    8/23/24: w/ Malik, CDE Data for last 14 days --  Average Glucose: 124  0 % Very High  3 % High  97 % In Range  0% Low  0% Very Low    Treatment: - Ozempic 0.25 mg weekly  - Long acting 27 u daily and MF 1000 mg per day.     Recommendation 8/28/24: Increase ozempic to 0.5 mg weekly ( if used 0.25 mg x 4 weeks already), Continue MF 1000 mg daily and Decrease Lantus from 27 to 20 units.   Continue f/u w/ me on 9/9/24. DEEPA

## 2024-08-28 NOTE — PROGRESS NOTES
[facility] Neurology Outpatient Progress Note  Date of service: 8/28/2024    Assessment:     ICD-10-CM    1. Cerebrovascular accident (CVA), unspecified mechanism (HCC)  I63.9         Multifocal punctate acute/subacute infarcts involving the left cerebellar hemisphere and left posterior medulla. Patient clinically improving      Plan:  Cont therapy  Cont current antiplatelets + statin  Cardiologist to follow re; event monitor  Reviewed MRI brain with pt and family  Stroke education given  Stroke risk factors management by primary   See orders and medications filed with this encounter. The patient indicates understanding of these issues and agrees with the plan.  Discussed with patient/family regarding assessment, work up, care plan   RTC 3 months  Pt should go ER for any new or worsening symptoms and contact office   A total of 40 minutes were spent on patient care,  more than 50% was spent counseling patient regarding studies' results, assessment, treatment options and care plan.    Subjective:   History:  Patient here for a follow-up visit for stroke. Since discharge he has been stable.   Per inhouse note by Dr Trivedi:  \"74 year old male with past medical history significant for coronary disease and hypertension, who presented to emergency department with complaints of 3 to 4-day history of increasing unsteadiness, dizziness, nausea and vomiting. \" Neurology was following pt in hospital, here to establish follow up visit.  Family is here as well.  DM has been improving per pt.    History/Other:   REVIEW OF SYSTEMS:  A 10-point system was reviewed. Pertinent positives and negatives are noted as above       Current Outpatient Medications:     Glucose Blood (ONETOUCH VERIO) In Vitro Strip, Test blood sugar 3 times daily, before meals and at bedtime, Disp: 300 strip, Rfl: 1    semaglutide (OZEMPIC, 0.25 OR 0.5 MG/DOSE,) 2 MG/3ML Subcutaneous Solution Pen-injector, Inject 0.25 mg into the skin once a week. For 4 weeks, then  increase to 0.5 mg once a week thereafter, Disp: 3 mL, Rfl: 0    Multiple Vitamin (MULTIVITAMINS OR), Take 1 tablet by mouth daily., Disp: , Rfl:     TURMERIC OR, Take 1 tablet by mouth daily., Disp: , Rfl:     metFORMIN  MG Oral Tablet 24 Hr, Take 1 tablet (500 mg total) by mouth 2 (two) times daily with meals. (Patient taking differently: Take 2 tablets (1,000 mg total) by mouth 2 (two) times daily with meals.), Disp: 180 tablet, Rfl: 0    Microlet Lancets Does not apply Misc, Test blood sugar four times daily before meals and at bedtime.  (Please make sure they are the purple lancets for the Micorlet device), Disp: 100 each, Rfl: 0    Insulin Pen Needle (NOVOFINE AUTOCOVER PEN NEEDLE) 30G X 8 MM Does not apply Misc, Use to inject insulin up to 4 times per day, Disp: 100 each, Rfl: 0    insulin glargine (LANTUS SOLOSTAR) 100 UNIT/ML Subcutaneous Solution Pen-injector, Inject 27 Units into the skin nightly., Disp: , Rfl:     insulin aspart (NOVOLOG FLEXPEN) 100 Units/mL Subcutaneous Solution Pen-injector, Test blood glucose 4 times daily before meals Inject 2 unit if blood glucose is between 141-180 mg/dL Inject 3 units if blood glucose is between 181-220 mg/dL Inject 6 units if blood glucose is between 221-260 mg/dL Inject 8 units if blood glucose is between 261-300 mg/dL Inject 10 units if blood glucose is between 301-350 mg/dL Call your physician if blood glucose is greater than 351 mg/dL,, Disp: 5 each, Rfl: 3    aspirin 325 MG Oral Tab, Take 1 tablet (325 mg total) by mouth daily., Disp: 30 tablet, Rfl: 1    clopidogrel 75 MG Oral Tab, Take 1 tablet (75 mg total) by mouth daily., Disp: 30 tablet, Rfl: 1    amLODIPine 2.5 MG Oral Tab, Take 1 tablet (2.5 mg total) by mouth in the morning and 1 tablet (2.5 mg total) before bedtime., Disp: , Rfl:     losartan 50 MG Oral Tab, Take 1 tablet (50 mg total) by mouth daily., Disp: , Rfl:     metoprolol succinate ER 50 MG Oral Tablet 24 Hr, Take 1 tablet (50 mg  total) by mouth daily., Disp: , Rfl:     atorvastatin 40 MG Oral Tab, Take 1 tablet (40 mg total) by mouth nightly., Disp: , Rfl:   Allergies:  No Known Allergies  Past Medical History:    CAD (coronary artery disease)    Coronary atherosclerosis    Heart attack (HCC)    Hypertension     Past Surgical History:   Procedure Laterality Date    Cath bare metal stent (bms)      Total hip replacement       Social History:  Social History     Tobacco Use    Smoking status: Former     Current packs/day: 0.00     Types: Cigarettes     Quit date: 9/27/2013     Years since quitting: 10.9    Smokeless tobacco: Never   Substance Use Topics    Alcohol use: Not Currently     Family History   Problem Relation Age of Onset    Stroke Mother     Dementia Mother     Diabetes Maternal Grandfather     Diabetes Daughter         type 1     Objective:   Neurological Examination:  /82   Pulse 67   Wt 197 lb (89.4 kg)   SpO2 97%   BMI 29.95 kg/m²   Mental status: A & O X 3  Language: no aphasia  Speech: no dysarthria  CN II-XII: intact   Motor strength: 5/5 all extremities  Tone: normal  DTRs: + symmetric  Coordination: normal  Sensory: symmetric  Gait: uses cane    Test reviewed on 8/28/2024      Mag \"Jayesh\"MD Aleksander  Neurology  [facility]  8/28/2024, 11:23 AM  CC: No primary care provider on file.

## 2024-09-09 ENCOUNTER — OFFICE VISIT (OUTPATIENT)
Facility: CLINIC | Age: 74
End: 2024-09-09
Payer: MEDICARE

## 2024-09-09 VITALS
WEIGHT: 197 LBS | BODY MASS INDEX: 29.86 KG/M2 | HEIGHT: 68 IN | SYSTOLIC BLOOD PRESSURE: 120 MMHG | DIASTOLIC BLOOD PRESSURE: 70 MMHG | OXYGEN SATURATION: 98 % | HEART RATE: 77 BPM

## 2024-09-09 DIAGNOSIS — Z79.4 TYPE 2 DIABETES MELLITUS WITH HYPERGLYCEMIA, WITH LONG-TERM CURRENT USE OF INSULIN (HCC): Primary | ICD-10-CM

## 2024-09-09 DIAGNOSIS — E78.5 HYPERLIPIDEMIA, UNSPECIFIED HYPERLIPIDEMIA TYPE: ICD-10-CM

## 2024-09-09 DIAGNOSIS — I10 PRIMARY HYPERTENSION: ICD-10-CM

## 2024-09-09 DIAGNOSIS — E11.65 TYPE 2 DIABETES MELLITUS WITH HYPERGLYCEMIA, WITH LONG-TERM CURRENT USE OF INSULIN (HCC): Primary | ICD-10-CM

## 2024-09-09 PROCEDURE — 95251 CONT GLUC MNTR ANALYSIS I&R: CPT | Performed by: NURSE PRACTITIONER

## 2024-09-09 PROCEDURE — 99215 OFFICE O/P EST HI 40 MIN: CPT | Performed by: NURSE PRACTITIONER

## 2024-09-09 RX ORDER — INSULIN GLARGINE 100 [IU]/ML
INJECTION, SOLUTION SUBCUTANEOUS
Qty: 30 ML | Refills: 0 | Status: SHIPPED | OUTPATIENT
Start: 2024-09-09

## 2024-09-09 RX ORDER — BLOOD-GLUCOSE SENSOR
1 EACH MISCELLANEOUS
Qty: 6 EACH | Refills: 1 | Status: SHIPPED | OUTPATIENT
Start: 2024-09-09

## 2024-09-09 RX ORDER — METFORMIN HCL 500 MG
500 TABLET, EXTENDED RELEASE 24 HR ORAL 3 TIMES DAILY
Qty: 270 TABLET | Refills: 0 | Status: SHIPPED | OUTPATIENT
Start: 2024-09-09 | End: 2024-12-08

## 2024-09-09 NOTE — PROGRESS NOTES
[facility] Note    Name: Tahir Kapoor    Date: 2024     Tahir Kapoor is a 74 year old male who presents for evaluation of T2DM management.     Chief complaint: Follow - Up (PT here for routine DM2 f/u, per pt BG have not been good since medication changed.//Last A1c value was 10.3% done 7/15/2024. /)       Subjective:   DM hx:  -Diagnosed with diabetes in July 15, 2024 while he was hospitalized due to stroke  -Family history- yes, MGF ad daughter     Initial HPI consult in 2024  Pt was recently discharged from the hospital last 24 due to stroke  Per his wife, pt noted fatigue prior to hospitalization.   DM meds at first office visit: Lantus 27 units at bedtime ( started 27 last Friday, was 22 units prior to that), Novolog 2-3 units TID  Blood Glucose log reviewed. Checking 4 times per day. Morning/fastin-27, episodes of hypoglycemia: No  -Re: potential DM medication contraindication (if positive, checkbox selected):  [] History of pancreatitis- denies   [] Personal/fam hx of medullary thyroid cancer/MEN2 denies  [] History of recent/frequent UTI/yeast infxn- denies   [] Previous amputation related to diabetes- denies     -Presence of associated DM complications (if positive, checkbox selected):  [x] Macrovascular complications (CAD/CVA/PAD)- CVA  [] Neuropathy -denies   [] Retinopathy-denies  [] Nephropathy- denies  [x] HTN  [x] Hyperlipidemia  [x] Stroke/TIA- most recent  [] Gastroparesis- denies   Denies DKA     -Lifestyle: eats potato chips, bread  - Modifying factors:    Previously trialed/failed DM meds: denies     INTERIM HX:    9/10/2024  Current treatment: Lantus 20u qhs, used Novolog twice in the last month 2-3 units used twice in the last month ,  ozempic 0.5 mg weekly ( this will be his 2nd week), Metformin 500 mg BID  Testing: freestyle darlin 3 Hypoglycemia:   denies   Continuous Glucose Monitoring Interpretation  Tahir Kapoor  has undergone  continuous glucose monitoring with the Freestyle Rosa M 3 CGM with phone (connected to clinic profile).  The blood glucose tracings were evaluated for two weeks prior to office visit. Blood glucose tracings demonstrated no significant hyperglycemia. There were no areas of hypoglycemia notedduring the weeks of evaluation.     Reviewed CGM and noted TIR 94%, GMI 6.6% , Low and Very low 0% (prev )        Complication: no hospitalization since last visit.       History/Other:    Allergies, PMH, SocHx and FHx reviewed and updated as appropriate in Epic on    insulin glargine (LANTUS SOLOSTAR) 100 UNIT/ML Subcutaneous Solution Pen-injector Inject 20 units per day, TDD max:33 units /day 30 mL 0    Continuous Glucose Sensor (FREESTYLE ROSA M 3 SENSOR) Does not apply Misc 1 each every 14 (fourteen) days. 6 each 1    metFORMIN  MG Oral Tablet 24 Hr Take 1 tablet (500 mg total) by mouth in the morning, at noon, and at bedtime. 270 tablet 0    clopidogrel 75 MG Oral Tab Take 1 tablet (75 mg total) by mouth daily. 90 tablet 1    Glucose Blood (ONETOUCH VERIO) In Vitro Strip Test blood sugar 3 times daily, before meals and at bedtime 300 strip 1    semaglutide (OZEMPIC, 0.25 OR 0.5 MG/DOSE,) 2 MG/3ML Subcutaneous Solution Pen-injector Inject 0.25 mg into the skin once a week. For 4 weeks, then increase to 0.5 mg once a week thereafter 3 mL 0    Multiple Vitamin (MULTIVITAMINS OR) Take 1 tablet by mouth daily.      TURMERIC OR Take 1 tablet by mouth daily.      Microlet Lancets Does not apply Misc Test blood sugar four times daily before meals and at bedtime.  (Please make sure they are the purple lancets for the Micorlet device) 100 each 0    Insulin Pen Needle (NOVOFINE AUTOCOVER PEN NEEDLE) 30G X 8 MM Does not apply Misc Use to inject insulin up to 4 times per day 100 each 0    insulin aspart (NOVOLOG FLEXPEN) 100 Units/mL Subcutaneous Solution Pen-injector Test blood glucose 4 times daily before meals Inject 2 unit if  blood glucose is between 141-180 mg/dL Inject 3 units if blood glucose is between 181-220 mg/dL Inject 6 units if blood glucose is between 221-260 mg/dL Inject 8 units if blood glucose is between 261-300 mg/dL Inject 10 units if blood glucose is between 301-350 mg/dL Call your physician if blood glucose is greater than 351 mg/dL, 5 each 3    aspirin 325 MG Oral Tab Take 1 tablet (325 mg total) by mouth daily. 30 tablet 1    amLODIPine 2.5 MG Oral Tab Take 1 tablet (2.5 mg total) by mouth in the morning and 1 tablet (2.5 mg total) before bedtime.      losartan 50 MG Oral Tab Take 1 tablet (50 mg total) by mouth daily.      metoprolol succinate ER 50 MG Oral Tablet 24 Hr Take 1 tablet (50 mg total) by mouth daily.      atorvastatin 40 MG Oral Tab Take 1 tablet (40 mg total) by mouth nightly.       No Known Allergies  Current Outpatient Medications   Medication Sig Dispense Refill    insulin glargine (LANTUS SOLOSTAR) 100 UNIT/ML Subcutaneous Solution Pen-injector Inject 20 units per day, TDD max:33 units /day 30 mL 0    Continuous Glucose Sensor (FREESTYLE BRITTANY 3 SENSOR) Does not apply Misc 1 each every 14 (fourteen) days. 6 each 1    metFORMIN  MG Oral Tablet 24 Hr Take 1 tablet (500 mg total) by mouth in the morning, at noon, and at bedtime. 270 tablet 0    clopidogrel 75 MG Oral Tab Take 1 tablet (75 mg total) by mouth daily. 90 tablet 1    Glucose Blood (ONETOUCH VERIO) In Vitro Strip Test blood sugar 3 times daily, before meals and at bedtime 300 strip 1    semaglutide (OZEMPIC, 0.25 OR 0.5 MG/DOSE,) 2 MG/3ML Subcutaneous Solution Pen-injector Inject 0.25 mg into the skin once a week. For 4 weeks, then increase to 0.5 mg once a week thereafter 3 mL 0    Multiple Vitamin (MULTIVITAMINS OR) Take 1 tablet by mouth daily.      TURMERIC OR Take 1 tablet by mouth daily.      Microlet Lancets Does not apply Misc Test blood sugar four times daily before meals and at bedtime.  (Please make sure they are the purple  lancets for the Micorlet device) 100 each 0    Insulin Pen Needle (NOVOFINE AUTOCOVER PEN NEEDLE) 30G X 8 MM Does not apply Misc Use to inject insulin up to 4 times per day 100 each 0    insulin aspart (NOVOLOG FLEXPEN) 100 Units/mL Subcutaneous Solution Pen-injector Test blood glucose 4 times daily before meals Inject 2 unit if blood glucose is between 141-180 mg/dL Inject 3 units if blood glucose is between 181-220 mg/dL Inject 6 units if blood glucose is between 221-260 mg/dL Inject 8 units if blood glucose is between 261-300 mg/dL Inject 10 units if blood glucose is between 301-350 mg/dL Call your physician if blood glucose is greater than 351 mg/dL, 5 each 3    aspirin 325 MG Oral Tab Take 1 tablet (325 mg total) by mouth daily. 30 tablet 1    amLODIPine 2.5 MG Oral Tab Take 1 tablet (2.5 mg total) by mouth in the morning and 1 tablet (2.5 mg total) before bedtime.      losartan 50 MG Oral Tab Take 1 tablet (50 mg total) by mouth daily.      metoprolol succinate ER 50 MG Oral Tablet 24 Hr Take 1 tablet (50 mg total) by mouth daily.      atorvastatin 40 MG Oral Tab Take 1 tablet (40 mg total) by mouth nightly.       Past Medical History:    CAD (coronary artery disease)    Coronary atherosclerosis    Heart attack (HCC)    Hypertension     Family History   Problem Relation Age of Onset    Stroke Mother     Dementia Mother     Diabetes Maternal Grandfather     Diabetes Daughter         type 1     Social history: Reviewed.      ROS/Exam    REVIEW OF SYSTEMS: Ten point review of systems has been performed and is otherwise negative and/or non-contributory, except as described above.     VITALS  Vitals:    09/09/24 1018   BP: 120/70   Pulse: 77   SpO2: 98%   Weight: 197 lb (89.4 kg)   Height: 5' 8\" (1.727 m)       Wt Readings from Last 6 Encounters:   09/09/24 197 lb (89.4 kg)   08/28/24 197 lb (89.4 kg)   07/29/24 180 lb (81.6 kg)   07/25/24 189 lb (85.7 kg)   07/15/24 197 lb 5 oz (89.5 kg)   11/26/13 202 lb 13.2 oz  (92 kg)       PHYSICAL EXAM  CONSTITUTIONAL:  awake, alert, cooperative, no apparent distress, and appears stated age, Vss stable   PSYCH: normal affect  LUNGS: breathing comfortably  CARDIOVASCULAR:  regular rate   NECK:  no palpable thyroid nodules, no lymphadenopathy   Musculoskeletal:        Labs/Imaging:    Lab Results   Component Value Date    CHOLEST 140 07/16/2024    CHOLEST 186 07/12/2024    TRIG 207 (H) 07/16/2024    TRIG 202 (H) 07/12/2024    HDL 43 07/16/2024    HDL 55 07/12/2024    LDL 63 07/16/2024    LDL 97 07/12/2024     No results found for: \"MICROALBCREA\"   Lab Results   Component Value Date    CREATSERUM 0.97 07/17/2024    CREATSERUM 1.03 07/15/2024    EGFRCR 81 07/17/2024    EGFRCR 76 07/15/2024     Lab Results   Component Value Date    AST 17 07/15/2024    AST 16 07/12/2024    ALT 30 07/15/2024    ALT 36 07/12/2024       Lab Results   Component Value Date    TSH 1.941 08/09/2024    TSH 1.640 10/26/2021         Overall glucose control:  Lab Results   Component Value Date    A1C 10.3 (H) 07/15/2024       Supplementary Documentation:   -Surveillance for Diabetes Complications & Risks  Foot exam/neuropathy: Last Foot Exam: 07/29/24  NL exam    Retinopathy screening: Last Dilated Eye Exam: 07/01/22  Eye Exam shows Diabetic Retinopathy?: No  7/2023    Assessment & Plan:     ICD-10-CM    1. Type 2 diabetes mellitus with hyperglycemia, with long-term current use of insulin ([facility])  E11.65 insulin glargine (LANTUS SOLOSTAR) 100 UNIT/ML Subcutaneous Solution Pen-injector    Z79.4 GLUC MNTR CONT REC FROM NTRSTL TISS FLU PHYS I&R     Microalb/Creat Ratio, Random Urine     Continuous Glucose Sensor (FREESTYLE BRITTANY 3 SENSOR) Does not apply Misc     metFORMIN  MG Oral Tablet 24 Hr      2. Primary hypertension  I10       3. Hyperlipidemia, unspecified hyperlipidemia type  E78.5       4. BMI 29.0-29.9,adult  Z68.29           Tahir Kapoor is a pleasant 74 year old male here for evaluation  of:    #Diabetes- PMHx of Type 2 diabetes mellitus diagnosed last 7/17/24 after he was dx w/ CVA.   Last A1c value was 10.3% done 7/15/2024.   A1c above goal.  Reviewed CGM and noted TIR 94%, GMI 6.6% , Low and Very low 0%, GMI now at goal. Might also consider adding SGLT2 I on next visit or increasing dose of ozempic.  Discussed symptoms and treatment of hypoglycemia. Might consider pioglitazone/actos given hx of CVA if cannot avail GLP1 agonist. Goal <7%. Importance of better glucose control in preventing onset/progression of end-organ damage discussed, as well as goals of therapy and clinical significance of A1C.     - med changes:  - Decrease Lantus to 18 units  - stop novolog  - Increase Metformin from  1000 mg per day  to 1500 mg/day  - Continue Ozempic 0.5 mg weekly  - Let me know once you see the ophthalmologist/optometrist if you have any diabetic retinopathy  - Follow up in 6 weeks  -Balanced diet: carbohydrates, protein, healthy fats and fiber in each meal. Exercise of at least 150 mins each week. Decrease your simple carbohydrates intake such as sweets: cookies, soda, candy, white rice, white pasta, syrups  - see ophthalmology/optometrist, once a year to check for any diabetic retinopathy   - continue [employer] 3 CGM with phone (connected to clinic profile)   -See above header \"Supplementary Documentation\" for surveillance for diabetes complications & risks  - given optho list to see for retina check     #Nephropathy screening: Ordered [facility],  on losartan now.   Lab Results   Component Value Date    EGFRCR 81 07/17/2024      #HTN: SBP is to goal <130 per PCP  BP Readings from Last 1 Encounters:   09/09/24 120/70   BP Meds: amLODIPine Tabs - 2.5 MG; losartan Tabs - 50 MG; metoprolol succinate ER Tb24 - 50 MG       #CVA  - per PCP, on ASA and clopidogrel and metoprolol    #Hyperlipidemia/Lipids: LDL is to goal, per PCP   Lab Results   Component Value Date    LDL 63 07/16/2024    TRIG 207 (H)  07/16/2024   Cholesterol Meds: atorvastatin Tabs - 40 MG      #BMI 29  Balanced diet: carbohydrates, protein, healthy fats and fiber in each meal. Exercise of at least 150 mins each week. Decrease your simple carbohydrates intake such as sweets: cookies, soda, candy, white rice, white pasta, syrups    Return in about 6 weeks (around 10/21/2024).  Total time spent  45 minutes today on obtaining history, reviewing pertinent labs, evaluating patient, providing multiple treatment options, reinforcing diet/exercise and compliance, and completing documentation, of which greater than 50% was spent in face to face discussion with the patient  who demonstrated understanding and agreement with plan.     Thank you for allowing me to participate in the care of this patient.  Please feel free to contact me with any questions.    BUZZ Gómez, [facility]  Endocrinology, Diabetes & Metabolism   09/09/24    In reviewing this note, please be advised that Dragon Voice Recognition software used to dictate the note may have made errors in recognizing some of the words or phrases.     Note to patient: The 21 Century Cures Act makes medical notes like these available to patients in the interest of transparency. However, be advised this is a medical document. It is intended as peer to peer communication. It is written in medical language and may contain abbreviations or verbiage that are unfamiliar. It may appear blunt or direct. Medical documents are intended to carry relevant information, facts as evident, and the clinical opinion of the practitioner.

## 2024-09-09 NOTE — PATIENT INSTRUCTIONS
Follow up plan:  With BUZZ Emery: Return in about 6 weeks (around 10/21/2024).    [ x ] In person only  [ x ] After visit summary   [ x ] Placed labs/imaging.[x  ] Directions to 1st floor lab      Discharge Instruction:  - see ophthalmology/optometrist, once a year to check for any diabetic retinopathy   - Decrease Lantus to 18 units  - stop novolog  - Increase Metformin from  1000 mg per day  to 1500 mg/day  - Continue Ozempic 0.5 mg weekly  - Let me know once you see the ophthalmologist/optometrist if you have any diabetic retinopathy  - Follow up in 6 weeks  -Balanced diet: carbohydrates, protein, healthy fats and fiber in each meal. Exercise of at least 150 mins each week. Decrease your simple carbohydrates intake such as sweets: cookies, soda, candy, white rice, white pasta, syrups        Office phone number: [phone]; phones are open Monday-Friday 8:30-4:30.   Thank you for visiting our office. We look forward to working with you to reach your health goals. As a reminder, if you need refills, please request early so there is enough time to process the request. We ask that you provide at least 5 days' notice before a refill is due, so there is time to send a request to pharmacy, process the refill, and ensure there are no other problems with obtaining the medication (backorders, prior authorization paperwork, etc). Routine refills will not be addressed on weekends, so please submit these requests during the week.    Blood sugar targets:  Before breakfast: , 2 hours after meals: <180, prior lunch or dinner < 140  Time in Range goal is higher than 80% if using a continuous glucose monitor (Dexcom or Rosa M).  Time in Range can be found within the Dexcom G7 tiffany, on Clarity if using Dexcom G6, or on LibreView if using Rosa M.    HOW TO TREAT LOW BLOOD SUGAR (Hypoglycemia)  Low blood sugar= Less than 70, or if you start to have symptoms (below)  Symptoms: Shaking or trembling, fast heart rate, extreme  hunger, sweating, confusion/difficulty concentrating, dizziness.    How to treat a low blood sugar if you are able to eat/drink: The Rule of 15/15  If you are using continuous glucose monitor that says you are low, but you do not have any symptoms, verify on fingerstick that your blood sugar is actually low before treating.   Eat 15 grams of carbs (see examples below)  Check your blood sugar after 15 minutes. If it’s still below your target range, have another serving.   Repeat these steps until it’s in your target range. Once it’s in range, if you're nervous about your sugar going low again, have a protein source (ie, a spoonful of peanut butter).   If you have a CGM you want to look for how your arrow has changed. If you arrow is pointed up or sideways after 15 min, give your CGM more time OR check with a finger stick. Try not to eat more food until at least 15 min after the first BG check - otherwise you risk having a rebound high.  If you are experiencing symptoms and you are unable to check your blood glucose for any reason, treat the hypoglycemia.  If someone has a low blood sugar and is unconscious: Don’t hesitate to call 911. If someone is unconscious and glucagon is not available or someone does not know how to use it, call 911 immediately.    To treat a low, I recommend you carry with you easy, pre-portioned treatment for low blood sugars that are 15G of carbs:   - Children sized squeeze pouch applesauce (high fiber + carbs help prevent too high of a spike)  - Small children's sized juicebox- 15g carb --> 4oz juice box  - Glucose tablets from [employer], you can find them near diabetes supplies --> Note, you will need to eat 3-4 tablets to get to 15g of carbs  - Children sized fruit snack pack- look for one with 15 grams of total carbohydrate  - Choice of how to treat your low is important. Complex carbs, or foods that contain fats along with carbs (like chocolate) can slow the absorption of glucose and  should NOT be used to treat an emergency low

## 2024-09-13 DIAGNOSIS — Z79.4 TYPE 2 DIABETES MELLITUS WITH HYPERGLYCEMIA, WITH LONG-TERM CURRENT USE OF INSULIN (HCC): ICD-10-CM

## 2024-09-13 DIAGNOSIS — E11.65 TYPE 2 DIABETES MELLITUS WITH HYPERGLYCEMIA, WITH LONG-TERM CURRENT USE OF INSULIN (HCC): ICD-10-CM

## 2024-09-13 RX ORDER — SEMAGLUTIDE 0.68 MG/ML
0.5 INJECTION, SOLUTION SUBCUTANEOUS WEEKLY
Qty: 9 ML | Refills: 0 | Status: SHIPPED | OUTPATIENT
Start: 2024-09-13

## 2024-09-13 NOTE — TELEPHONE ENCOUNTER
9/13/24 Patient's wife called in asking to speak with RN    Please call her back at [phone] (Becka)

## 2024-09-13 NOTE — TELEPHONE ENCOUNTER
Phoned nico's wife back, ok per BHUPENDRA.    Requesting refill of Ozempic.    Confirmed patient's current dose is 0.5mg weekly. (See last office visit note dated 9/9/24: - Continue Ozempic 0.5 mg weekly).     Requesting 90 day supply.     Endocrine Refill protocol for oral and injectable diabetic medications    Protocol Criteria:  FAILED  Reason: Elevated A1C  -Appointment with Endocrinology completed in the last 6 months or scheduled in the next 3 months    -A1c result below 8.5% in the past 6 months      Verify the above has been completed or scheduled in the appropriate timeline. If so can send a 90 day supply with 1 refill.     Last completed office visit: 9/9/2024 Gypsy Russo APRN   Next scheduled Follow up:   Future Appointments   Date Time Provider Department Center   9/17/2024  1:00 PM Jayesh Chavarria MD [facility]   10/21/2024 10:15 AM Gypsy Russo APRN [facility]   11/27/2024 10:40 AM Mag Armijo MD [facility]      Last A1c result: Last A1c value was 10.3% done 7/15/2024.    Refill protocol failed.     Routing on to provider for review.

## 2024-10-11 ENCOUNTER — MED REC SCAN ONLY (OUTPATIENT)
Facility: CLINIC | Age: 74
End: 2024-10-11

## 2024-10-24 ENCOUNTER — LAB ENCOUNTER (OUTPATIENT)
Dept: LAB | Age: 74
End: 2024-10-24
Attending: NURSE PRACTITIONER
Payer: MEDICARE

## 2024-10-24 DIAGNOSIS — Z79.4 TYPE 2 DIABETES MELLITUS WITH HYPERGLYCEMIA, WITH LONG-TERM CURRENT USE OF INSULIN (HCC): ICD-10-CM

## 2024-10-24 DIAGNOSIS — E11.65 TYPE 2 DIABETES MELLITUS WITH HYPERGLYCEMIA, WITH LONG-TERM CURRENT USE OF INSULIN (HCC): ICD-10-CM

## 2024-10-24 LAB
CREAT UR-SCNC: 155.9 MG/DL
MICROALBUMIN UR-MCNC: 0.6 MG/DL
MICROALBUMIN/CREAT 24H UR-RTO: 3.8 UG/MG (ref ?–30)

## 2024-10-24 PROCEDURE — 82043 UR ALBUMIN QUANTITATIVE: CPT

## 2024-10-24 PROCEDURE — 82570 ASSAY OF URINE CREATININE: CPT

## 2024-11-06 ENCOUNTER — OFFICE VISIT (OUTPATIENT)
Facility: CLINIC | Age: 74
End: 2024-11-06
Payer: MEDICARE

## 2024-11-06 VITALS
HEART RATE: 81 BPM | BODY MASS INDEX: 29.55 KG/M2 | WEIGHT: 195 LBS | SYSTOLIC BLOOD PRESSURE: 126 MMHG | DIASTOLIC BLOOD PRESSURE: 74 MMHG | HEIGHT: 68 IN | OXYGEN SATURATION: 97 %

## 2024-11-06 DIAGNOSIS — E11.65 TYPE 2 DIABETES MELLITUS WITH HYPERGLYCEMIA, WITH LONG-TERM CURRENT USE OF INSULIN (HCC): Primary | ICD-10-CM

## 2024-11-06 DIAGNOSIS — E78.5 HYPERLIPIDEMIA, UNSPECIFIED HYPERLIPIDEMIA TYPE: ICD-10-CM

## 2024-11-06 DIAGNOSIS — E55.9 VITAMIN D DEFICIENCY: ICD-10-CM

## 2024-11-06 DIAGNOSIS — Z79.4 TYPE 2 DIABETES MELLITUS WITH HYPERGLYCEMIA, WITH LONG-TERM CURRENT USE OF INSULIN (HCC): Primary | ICD-10-CM

## 2024-11-06 DIAGNOSIS — I10 PRIMARY HYPERTENSION: ICD-10-CM

## 2024-11-06 LAB — HEMOGLOBIN A1C: 6.4 % (ref 4.3–5.6)

## 2024-11-06 PROCEDURE — 99215 OFFICE O/P EST HI 40 MIN: CPT | Performed by: NURSE PRACTITIONER

## 2024-11-06 PROCEDURE — 83036 HEMOGLOBIN GLYCOSYLATED A1C: CPT | Performed by: NURSE PRACTITIONER

## 2024-11-06 PROCEDURE — G2211 COMPLEX E/M VISIT ADD ON: HCPCS | Performed by: NURSE PRACTITIONER

## 2024-11-06 RX ORDER — SEMAGLUTIDE 0.68 MG/ML
0.25 INJECTION, SOLUTION SUBCUTANEOUS WEEKLY
Qty: 3 ML | Refills: 2 | Status: SHIPPED | OUTPATIENT
Start: 2024-11-06

## 2024-11-06 NOTE — PROGRESS NOTES
[facility] Note    Name: Tahir Kapoor    Date: 24    Tahir Kapoor is a 74 year old male who presents for evaluation of T2DM management.     Chief complaint: Follow - Up (PT here for routine DM2 f/u, per pt no current concerns or sx./Per pt checks BG via CGM Rosa M 3 with reader- patient stated that he had bad sensor so since Saturday patient has been doing fingerstick's/Last A1c value was 10.3% done 7/15/2024. /Last Diabetic Eye Exam- 22/Last Diabetic Foot Exam-24)       Subjective:   DM hx:  -Diagnosed with diabetes in July 15, 2024 while he was hospitalized due to stroke  -Family history- yes, MGF ad daughter     Initial HPI consult in 2024  Pt was recently discharged from the hospital last 24 due to stroke  Per his wife, pt noted fatigue prior to hospitalization.   DM meds at first office visit: Lantus 27 units at bedtime ( started 27 last Friday, was 22 units prior to that), Novolog 2-3 units TID  Blood Glucose log reviewed. Checking 4 times per day. Morning/fastin-27, episodes of hypoglycemia: No  -Re: potential DM medication contraindication (if positive, checkbox selected):  [] History of pancreatitis- denies   [] Personal/fam hx of medullary thyroid cancer/MEN2 denies  [] History of recent/frequent UTI/yeast infxn- denies   [] Previous amputation related to diabetes- denies     -Presence of associated DM complications (if positive, checkbox selected):  [x] Macrovascular complications (CAD/CVA/PAD)- CVA  [] Neuropathy -denies   [] Retinopathy-denies  [] Nephropathy- denies  [x] HTN  [x] Hyperlipidemia  [x] Stroke/TIA-   [] Gastroparesis- denies   Denies DKA     -Lifestyle: eats potato chips, bread  - Modifying factors:    Previously trialed/failed DM meds: denies     INTERIM HX:    24 Current treatment: Lantus 20u qhs, used Novolog twice in the last month 2-3 units used twice in the last month ,  ozempic 0.5 mg weekly ( this will be his  2nd week), Metformin 500 mg twice daily; Testing: freestyle rosa m 3 Hypoglycemia:   denies   Continuous Glucose Monitoring Interpretation  Tahir Kapoor  has undergone continuous glucose monitoring with the Freestyle Rosa M 3 CGM with phone (connected to clinic profile).  The blood glucose tracings were evaluated for two weeks prior to office visit. Blood glucose tracings demonstrated no significant hyperglycemia. There were no areas of hypoglycemia notedduring the weeks of evaluation.    Date: 8/27-9/9/24: TIR 94%, GMI 6.6% ,  low 0% , very low 0%, SD 18.8mg/dl, Sensor usage: 97%  Complication: no hospitalization since last visit.     11/06/24: In the last few days, his rosa m became faulty and he called already rosa m company and waiting for replacement. He started fingerstick machine. States he cannot tolerate 0.5 mg of ozempic and wants to go back to 0.25  Current treatment:  Lantus to 18 units, Metformin 1500 mg/day, Ozempic 0.5 mg weekly  Testing: patient unable to remember levels  Hypoglycemia: 70  New complication related to DM:   no hospitalization and no ER visit since last office visit.     History/Other:    Allergies, PMH, SocHx and FHx reviewed and updated as appropriate in Epic on    semaglutide (OZEMPIC, 0.25 OR 0.5 MG/DOSE,) 2 MG/3ML Subcutaneous Solution Pen-injector Inject 0.25 mg into the skin once a week. 3 mL 2    insulin glargine (LANTUS SOLOSTAR) 100 UNIT/ML Subcutaneous Solution Pen-injector Inject 20 units per day, TDD max:33 units /day 30 mL 0    Continuous Glucose Sensor (FREESTYLE ROSA M 3 SENSOR) Does not apply Misc 1 each every 14 (fourteen) days. 6 each 1    metFORMIN  MG Oral Tablet 24 Hr Take 1 tablet (500 mg total) by mouth in the morning, at noon, and at bedtime. 270 tablet 0    clopidogrel 75 MG Oral Tab Take 1 tablet (75 mg total) by mouth daily. 90 tablet 1    Glucose Blood (ONETOUCH VERIO) In Vitro Strip Test blood sugar 3 times daily, before meals and at bedtime 300  strip 1    Multiple Vitamin (MULTIVITAMINS OR) Take 1 tablet by mouth daily.      TURMERIC OR Take 1 tablet by mouth daily.      Microlet Lancets Does not apply Misc Test blood sugar four times daily before meals and at bedtime.  (Please make sure they are the purple lancets for the Micorlet device) 100 each 0    Insulin Pen Needle (NOVOFINE AUTOCOVER PEN NEEDLE) 30G X 8 MM Does not apply Misc Use to inject insulin up to 4 times per day 100 each 0    insulin aspart (NOVOLOG FLEXPEN) 100 Units/mL Subcutaneous Solution Pen-injector Test blood glucose 4 times daily before meals Inject 2 unit if blood glucose is between 141-180 mg/dL Inject 3 units if blood glucose is between 181-220 mg/dL Inject 6 units if blood glucose is between 221-260 mg/dL Inject 8 units if blood glucose is between 261-300 mg/dL Inject 10 units if blood glucose is between 301-350 mg/dL Call your physician if blood glucose is greater than 351 mg/dL, 5 each 3    aspirin 325 MG Oral Tab Take 1 tablet (325 mg total) by mouth daily. 30 tablet 1    amLODIPine 2.5 MG Oral Tab Take 1 tablet (2.5 mg total) by mouth in the morning and 1 tablet (2.5 mg total) before bedtime.      losartan 50 MG Oral Tab Take 1 tablet (50 mg total) by mouth daily.      metoprolol succinate ER 50 MG Oral Tablet 24 Hr Take 1 tablet (50 mg total) by mouth daily.      atorvastatin 40 MG Oral Tab Take 1 tablet (40 mg total) by mouth nightly.       No Known Allergies  Current Outpatient Medications   Medication Sig Dispense Refill    semaglutide (OZEMPIC, 0.25 OR 0.5 MG/DOSE,) 2 MG/3ML Subcutaneous Solution Pen-injector Inject 0.25 mg into the skin once a week. 3 mL 2    insulin glargine (LANTUS SOLOSTAR) 100 UNIT/ML Subcutaneous Solution Pen-injector Inject 20 units per day, TDD max:33 units /day 30 mL 0    Continuous Glucose Sensor (FREESTYLE BRITTANY 3 SENSOR) Does not apply Misc 1 each every 14 (fourteen) days. 6 each 1    metFORMIN  MG Oral Tablet 24 Hr Take 1 tablet (500  mg total) by mouth in the morning, at noon, and at bedtime. 270 tablet 0    clopidogrel 75 MG Oral Tab Take 1 tablet (75 mg total) by mouth daily. 90 tablet 1    Glucose Blood (ONETOUCH VERIO) In Vitro Strip Test blood sugar 3 times daily, before meals and at bedtime 300 strip 1    Multiple Vitamin (MULTIVITAMINS OR) Take 1 tablet by mouth daily.      TURMERIC OR Take 1 tablet by mouth daily.      Microlet Lancets Does not apply Misc Test blood sugar four times daily before meals and at bedtime.  (Please make sure they are the purple lancets for the Micorlet device) 100 each 0    Insulin Pen Needle (NOVOFINE AUTOCOVER PEN NEEDLE) 30G X 8 MM Does not apply Misc Use to inject insulin up to 4 times per day 100 each 0    insulin aspart (NOVOLOG FLEXPEN) 100 Units/mL Subcutaneous Solution Pen-injector Test blood glucose 4 times daily before meals Inject 2 unit if blood glucose is between 141-180 mg/dL Inject 3 units if blood glucose is between 181-220 mg/dL Inject 6 units if blood glucose is between 221-260 mg/dL Inject 8 units if blood glucose is between 261-300 mg/dL Inject 10 units if blood glucose is between 301-350 mg/dL Call your physician if blood glucose is greater than 351 mg/dL, 5 each 3    aspirin 325 MG Oral Tab Take 1 tablet (325 mg total) by mouth daily. 30 tablet 1    amLODIPine 2.5 MG Oral Tab Take 1 tablet (2.5 mg total) by mouth in the morning and 1 tablet (2.5 mg total) before bedtime.      losartan 50 MG Oral Tab Take 1 tablet (50 mg total) by mouth daily.      metoprolol succinate ER 50 MG Oral Tablet 24 Hr Take 1 tablet (50 mg total) by mouth daily.      atorvastatin 40 MG Oral Tab Take 1 tablet (40 mg total) by mouth nightly.       Past Medical History:    CAD (coronary artery disease)    Coronary atherosclerosis    Heart attack (HCC)    Hypertension     Family History   Problem Relation Age of Onset    Stroke Mother     Dementia Mother     Diabetes Maternal Grandfather     Diabetes Daughter          type 1     Social history: Reviewed.      ROS/Exam    REVIEW OF SYSTEMS: Ten point review of systems has been performed and is otherwise negative and/or non-contributory, except as described above.     VITALS  Vitals:    11/06/24 0854   BP: 126/74   Pulse: 81   SpO2: 97%   Weight: 195 lb (88.5 kg)   Height: 5' 8\" (1.727 m)         Wt Readings from Last 6 Encounters:   11/06/24 195 lb (88.5 kg)   09/09/24 197 lb (89.4 kg)   08/28/24 197 lb (89.4 kg)   07/29/24 180 lb (81.6 kg)   07/25/24 189 lb (85.7 kg)   07/15/24 197 lb 5 oz (89.5 kg)       PHYSICAL EXAM  CONSTITUTIONAL:  awake, alert, cooperative, no apparent distress, and appears stated age, Vss stable   PSYCH: normal affect  LUNGS: breathing comfortably  CARDIOVASCULAR:  regular rate   NECK:  no palpable thyroid nodules, no lymphadenopathy       Labs/Imaging:    Lab Results   Component Value Date    CHOLEST 140 07/16/2024    CHOLEST 186 07/12/2024    TRIG 207 (H) 07/16/2024    TRIG 202 (H) 07/12/2024    HDL 43 07/16/2024    HDL 55 07/12/2024    LDL 63 07/16/2024    LDL 97 07/12/2024     Lab Results   Component Value Date    MICROALBCREA 3.8 10/24/2024      Lab Results   Component Value Date    CREATSERUM 0.97 07/17/2024    CREATSERUM 1.03 07/15/2024    EGFRCR 81 07/17/2024    EGFRCR 76 07/15/2024     Lab Results   Component Value Date    AST 17 07/15/2024    AST 16 07/12/2024    ALT 30 07/15/2024    ALT 36 07/12/2024       Lab Results   Component Value Date    TSH 1.941 08/09/2024    TSH 1.640 10/26/2021         Overall glucose control:  Lab Results   Component Value Date    A1C 6.4 (A) 11/06/2024    A1C 10.3 (H) 07/15/2024       Supplementary Documentation:   -Surveillance for Diabetes Complications & Risks  Foot exam/neuropathy: Last Foot Exam: 07/29/24  NL exam    Retinopathy screening: Last Dilated Eye Exam: 07/01/22  Eye Exam shows Diabetic Retinopathy?: No  Has appointment with [facility] on Dec 4, 2024    Assessment & Plan:     ICD-10-CM    1.  Type 2 diabetes mellitus with hyperglycemia, with long-term current use of insulin (HCC)  E11.65 POC Hemoglobin A1C    Z79.4 semaglutide (OZEMPIC, 0.25 OR 0.5 MG/DOSE,) 2 MG/3ML Subcutaneous Solution Pen-injector      2. Primary hypertension  I10       3. Hyperlipidemia, unspecified hyperlipidemia type  E78.5       4. Vitamin D deficiency  E55.9 Vitamin D      5. BMI 29.0-29.9,adult  Z68.29             Tahir Kapoor is a pleasant 74 year old male here for evaluation of:    #Diabetes- PMHx of Type 2 diabetes mellitus diagnosed last 7/17/24 after he was dx w/ CVA.   - Last A1c value was 6.4% done 11/6/2024.   A1c at goal. Patient cannot tolerage 0.5 mg of ozempic and he request to decrease the dose. Will decrease for now and adjusting his basal insulin.   -  Might  consider adding SGLT2 I on next visit, checking c peptide with BMP.   -  Goal <7%. Importance of better glucose control in preventing onset/progression of end-organ damage discussed, as well as goals of therapy and clinical significance of A1C.     - med changes:  Insulin: Increase Lantus from 18 to 22 units/day  Decrease Ozempic from 0.5 to 0.25 mg weekly  Continue Metformin 1500 mg per day  - Also if your average glucose starting to be higher than 154, call our office and will adjust your lantus since we decreased your ozempic.   - You can also inquire from your insurance of which other GLP1 agonist is cheaper: Trulicity, mounjaro, Rybelsus, Judson and let me know and will send it.   - continue your appointment with [facility] on December, let them fax usthe notes  - continue Freestyle Rosa M 3 CGM with phone (connected to clinic profile)   - Bring your glucometer  or  for your CGM ( dexcom/rosa m) in all office visits  -See above header \"Supplementary Documentation\" for surveillance for diabetes complications & risks      #Nephropathy screening:  on losartan now.   Lab Results   Component Value Date    EGFRCR 81 07/17/2024     MICROALBCREA 3.8 10/24/2024      #HTN: SBP is to goal <130 per PCP  BP Readings from Last 1 Encounters:   11/06/24 126/74   BP Meds: amLODIPine Tabs - 2.5 MG; losartan Tabs - 50 MG; metoprolol succinate ER Tb24 - 50 MG     #CVA  - per PCP, on ASA and clopidogrel and metoprolol    #Hyperlipidemia/Lipids: LDL is to goal, per PCP   Lab Results   Component Value Date    LDL 63 07/16/2024    TRIG 207 (H) 07/16/2024   Cholesterol Meds: atorvastatin Tabs - 40 MG      #Vitamin D deficiency  - checking level, not taking any MVI or Vit D over the counter     #BMI 29  - discussed balanced diet: carbohydrates, protein, healthy fats and fiber in each meal. Exercise of at least 150 mins each week. Decrease your simple carbohydrates intake such as sweets: cookies, soda, candy, white rice, white pasta, syrups    Return in about 3 months (around 2/6/2025) for diabetes follow up.  Total time spent  45 minutes today on obtaining history, reviewing pertinent labs, evaluating patient, providing multiple treatment options, reinforcing diet/exercise and compliance, and completing documentation, of which greater than 50% was spent in face to face discussion with the patient  who demonstrated understanding and agreement with plan.     Thank you for allowing me to participate in the care of this patient.  Please feel free to contact me with any questions.    BUZZ Gómez, [facility]-BC  Endocrinology, Diabetes & Metabolism   11/06/24      In reviewing this note, please be advised that Dragon Voice Recognition software used to dictate the note may have made errors in recognizing some of the words or phrases.     Note to patient: The 21 Century Cures Act makes medical notes like these available to patients in the interest of transparency. However, be advised this is a medical document. It is intended as peer to peer communication. It is written in medical language and may contain abbreviations or verbiage that are unfamiliar. It may appear  blunt or direct. Medical documents are intended to carry relevant information, facts as evident, and the clinical opinion of the practitioner.

## 2024-11-06 NOTE — PATIENT INSTRUCTIONS
Return Visit:  APN: Return in about 3 months (around 2/6/2025) for diabetes follow up.  [] Video visit  [x] In person only    []  Diabetes Education: in  weeks     Diet/Lifestyle:   [] Carb counting 101   [] Carb Aware (T2DM nutrition counseling)   [] Carb Aware (T2DM nutrition counseling) + BG check in   [] Insulin dose fine tuning (If not yet carb counting, start with carb count 101)  [] DM burnout counseling    Medication/devices:   [] Pump settings check in after changes made today   [] Starting insulin pump ( 90 mins)   [] Pump 101 ( learning about pumps and carb counting)   [] BG check in   [] Ordered CGM today- Patient will need to call the office to schedule CGM training once they receive their device. Call Office phone number: [phone] to schedule      [x]  Directions to 1st floor lab    []  Give blood sugar log  []  PAP paperwork for Ozempic/Jardiance (english/Arabic)     Summary of today's visit:  Medication changes:    Insulin: Increase Lantus from 18 to 22 units/day  Decrease Ozempic from 0.5 to 0.25 mg weekly  Continue Metformin 1500 mg per day  - Also if your average glucose starting to be higher than 154, call our office and will adjust your lantus since we decreased your ozempic.   - You can also inquire from your insurance of which other GLP1 agonist is cheaper: Trulicity, mounjaro, Rybelsus, Judson and let me know and will send it.   - continue your appointment [facility], let them fax usthe notes  - If on insulin, please ensure you have glucagon at home for emergencies   -If a faulty sensor or a sensor needs to be removed early for imaging/procedure, please call the sensor company and they will often send you a free replacement     Additional comments:   - If labs were ordered today, please complete prior to your follow up visit   - Please let us know if you require any refills at least 1 week prior to your medication running out   - Please call our office if sugars  at home are consistently greater than 250 or less than 70     HOW TO TREAT LOW BLOOD SUGAR (Hypoglycemia)  Low blood sugar= Less than 70, or if you start to have symptoms (below)  Symptoms: Shaking or trembling, fast heart rate, extreme hunger, sweating, confusion/difficulty concentrating, dizziness.    How to treat a low blood sugar if you are able to eat/drink: The Rule of 15/15  If you are using continuous glucose monitor that says you are low, but you do not have any symptoms, verify on fingerstick that your blood sugar is actually low before treating.   Eat 15 grams of carbs (see examples below)  Check your blood sugar after 15 minutes. If it’s still below your target range, have another serving.   Repeat these steps until it’s in your target range. Once it’s in range, if you're nervous about your sugar going low again, have a protein source (ie, a spoonful of peanut butter).   If you have a CGM you want to look for how your arrow has changed. If you arrow is pointed up or sideways after 15 min, give your CGM more time OR check with a finger stick. Try not to eat more food until at least 15 min after the first BG check - otherwise you risk having a rebound high.  If you are experiencing symptoms and you are unable to check your blood glucose for any reason, treat the hypoglycemia.  If someone has a low blood sugar and is unconscious: Don’t hesitate to call 911. If someone is unconscious and glucagon is not available or someone does not know how to use it, call 911 immediately.     To treat a low, I recommend you carry with you easy, pre-portioned treatment for low blood sugars that are 15G of carbs:   - Children sized squeeze pouch applesauce (high fiber + carbs help prevent too high of a spike)  - Small children's sized juicebox- 15g carb --> 4oz juice box  - Glucose tablets from [employer], you can find them near diabetes supplies --> Note, you will need to eat 3-4 tablets to get to 15g of carbs  -  Children sized fruit snack pack- look for one with 15 grams of total carbohydrate  - Choice of how to treat your low is important. Complex carbs, or foods that contain fats along with carbs (like chocolate) can slow the absorption of glucose and should NOT be used to treat an emergency low

## 2024-11-27 ENCOUNTER — OFFICE VISIT (OUTPATIENT)
Dept: NEUROLOGY | Facility: CLINIC | Age: 74
End: 2024-11-27
Payer: MEDICARE

## 2024-11-27 ENCOUNTER — TELEPHONE (OUTPATIENT)
Dept: NEUROLOGY | Facility: CLINIC | Age: 74
End: 2024-11-27

## 2024-11-27 VITALS
WEIGHT: 199 LBS | DIASTOLIC BLOOD PRESSURE: 80 MMHG | RESPIRATION RATE: 16 BRPM | HEIGHT: 68 IN | HEART RATE: 70 BPM | BODY MASS INDEX: 30.16 KG/M2 | OXYGEN SATURATION: 98 % | SYSTOLIC BLOOD PRESSURE: 140 MMHG

## 2024-11-27 DIAGNOSIS — R26.9 GAIT DISORDER: ICD-10-CM

## 2024-11-27 DIAGNOSIS — I63.9 CEREBROVASCULAR ACCIDENT (CVA), UNSPECIFIED MECHANISM (HCC): Primary | ICD-10-CM

## 2024-11-27 PROCEDURE — 99215 OFFICE O/P EST HI 40 MIN: CPT | Performed by: OTHER

## 2024-11-27 NOTE — PATIENT INSTRUCTIONS
Refill policies:    Allow 2-3 business days for refills; controlled substances may take longer.  Contact your pharmacy at least 5 days prior to running out of medication and have them send an electronic request or submit request through the “request refill” option in your [employer] account.  Refills are not addressed on weekends; covering physicians do not authorize routine medications on weekends.  No narcotics or controlled substances are refilled after noon on Fridays or by on call physicians.  By law, narcotics must be electronically prescribed.  A 30 day supply with no refills is the maximum allowed.  If your prescription is due for a refill, you may be due for a follow up appointment.  To best provide you care, patients receiving routine medications need to be seen at least once a year.  Patients receiving narcotic/controlled substance medications need to be seen at least once every 3 months.  In the event that your preferred pharmacy does not have the requested medication in stock (e.g. Backordered), it is your responsibility to find another pharmacy that has the requested medication available.  We will gladly send a new prescription to that pharmacy at your request.    Scheduling Tests:    If your physician has ordered radiology tests such as MRI or CT scans, please contact Central Scheduling at [phone] right away to schedule the test.  Once scheduled, the [facility] Centralized Referral Team will work with your insurance carrier to obtain pre-certification or prior authorization.  Depending on your insurance carrier, approval may take 3-10 days.  It is highly recommended patients assure they have received an authorization before having a test performed.  If test is done without insurance authorization, patient may be responsible for the entire amount billed.      Precertification and Prior Authorizations:  If your physician has recommended that you have a procedure or additional testing performed the [facility]  Centralized Referral Team will contact your insurance carrier to obtain pre-certification or prior authorization.    You are strongly encouraged to contact your insurance carrier to verify that your procedure/test has been approved and is a COVERED benefit.  Although the [facility] Centralized Referral Team does its due diligence, the insurance carrier gives the disclaimer that \"Although the procedure is authorized, this does not guarantee payment.\"    Ultimately the patient is responsible for payment.   Thank you for your understanding in this matter.  Paperwork Completion:  If you require FMLA or disability paperwork for your recovery, please make sure to either drop it off or have it faxed to our office at [phone]. Be sure the form has your name and date of birth on it.  The form will be faxed to our Forms Department and they will complete it for you.  There is a 25$ fee for all forms that need to be filled out.  Please be aware there is a 10-14 day turnaround time.  You will need to sign a release of information (BHUPENDRA) form if your paperwork does not come with one.  You may call the Forms Department with any questions at [phone].  Their fax number is [phone].

## 2024-11-27 NOTE — TELEPHONE ENCOUNTER
Received paperwork for parking placard at patients office visit. Completed and signed by provider. Copied and given back to patient. Endorsed copy to scanning.

## 2024-11-27 NOTE — PROGRESS NOTES
[facility] Neurology Outpatient Progress Note  Date of service: 11/27/2024    Assessment:     ICD-10-CM    1. Cerebrovascular accident (CVA), unspecified mechanism (HCC)  I63.9       2. Gait disorder  R26.9       Multifocal punctate acute/subacute infarcts involving the left cerebellar hemisphere and left posterior medulla. Patient clinically improving        Plan:  Fall precaution  Cont current antiplatelets asa 325 mg , plavix + statin  Cardiologist to follow   Stroke education given  Stroke risk factors management by primary   See orders and medications filed with this encounter. The patient indicates understanding of these issues and agrees with the plan.  Discussed with patient regarding assessment, care plan   RTC 9 -12 months  Pt should go ER for any new or worsening symptoms and contact office     A total of 40 minutes were spent on patient care,  more than 50% was spent counseling patient/family regarding studies' results, assessment, treatment options and care plan, 10 minutes were spent in (pre- and/or during visit) reviewing clinical data including imaging, labs and progress notes related to therapy or treatment.      Subjective:   History:  Patient here for a follow-up visit for stroke. Since last visit, he had one fall in sept, overall he has been stable. Hba1c improved. He is seeing cardiologist and was told no afib found on loop recorder.  Pt requested handicap placard.  Per inhouse note by Dr Trivedi:  \"74 year old male with past medical history significant for coronary disease and hypertension, who presented to emergency department with complaints of 3 to 4-day history of increasing unsteadiness, dizziness, nausea and vomiting. \" Neurology was following pt in hospital, here to establish follow up visit.  History/Other:   REVIEW OF SYSTEMS:  A 10-point system was reviewed. Pertinent positives and negatives are noted as above       Current Outpatient Medications:     semaglutide (OZEMPIC, 0.25 OR 0.5  MG/DOSE,) 2 MG/3ML Subcutaneous Solution Pen-injector, Inject 0.25 mg into the skin once a week., Disp: 3 mL, Rfl: 2    insulin glargine (LANTUS SOLOSTAR) 100 UNIT/ML Subcutaneous Solution Pen-injector, Inject 20 units per day, TDD max:33 units /day, Disp: 30 mL, Rfl: 0    Continuous Glucose Sensor (FREESTYLE BRITTANY 3 SENSOR) Does not apply Misc, 1 each every 14 (fourteen) days., Disp: 6 each, Rfl: 1    metFORMIN  MG Oral Tablet 24 Hr, Take 1 tablet (500 mg total) by mouth in the morning, at noon, and at bedtime., Disp: 270 tablet, Rfl: 0    clopidogrel 75 MG Oral Tab, Take 1 tablet (75 mg total) by mouth daily., Disp: 90 tablet, Rfl: 1    Glucose Blood (ONETOUCH VERIO) In Vitro Strip, Test blood sugar 3 times daily, before meals and at bedtime, Disp: 300 strip, Rfl: 1    Multiple Vitamin (MULTIVITAMINS OR), Take 1 tablet by mouth daily., Disp: , Rfl:     TURMERIC OR, Take 1 tablet by mouth daily., Disp: , Rfl:     Microlet Lancets Does not apply Misc, Test blood sugar four times daily before meals and at bedtime.  (Please make sure they are the purple lancets for the Micorlet device), Disp: 100 each, Rfl: 0    Insulin Pen Needle (NOVOFINE AUTOCOVER PEN NEEDLE) 30G X 8 MM Does not apply Misc, Use to inject insulin up to 4 times per day, Disp: 100 each, Rfl: 0    insulin aspart (NOVOLOG FLEXPEN) 100 Units/mL Subcutaneous Solution Pen-injector, Test blood glucose 4 times daily before meals Inject 2 unit if blood glucose is between 141-180 mg/dL Inject 3 units if blood glucose is between 181-220 mg/dL Inject 6 units if blood glucose is between 221-260 mg/dL Inject 8 units if blood glucose is between 261-300 mg/dL Inject 10 units if blood glucose is between 301-350 mg/dL Call your physician if blood glucose is greater than 351 mg/dL,, Disp: 5 each, Rfl: 3    aspirin 325 MG Oral Tab, Take 1 tablet (325 mg total) by mouth daily., Disp: 30 tablet, Rfl: 1    amLODIPine 2.5 MG Oral Tab, Take 1 tablet (2.5 mg total) by  mouth in the morning and 1 tablet (2.5 mg total) before bedtime., Disp: , Rfl:     losartan 50 MG Oral Tab, Take 1 tablet (50 mg total) by mouth daily., Disp: , Rfl:     metoprolol succinate ER 50 MG Oral Tablet 24 Hr, Take 1 tablet (50 mg total) by mouth daily., Disp: , Rfl:     atorvastatin 40 MG Oral Tab, Take 1 tablet (40 mg total) by mouth nightly., Disp: , Rfl:   Allergies:  Allergies[1]  Past Medical History:    CAD (coronary artery disease)    Coronary atherosclerosis    Heart attack (HCC)    Hypertension     Past Surgical History:   Procedure Laterality Date    Cath bare metal stent (bms)      Total hip replacement       Social History:  Social History     Tobacco Use    Smoking status: Former     Current packs/day: 0.00     Types: Cigarettes     Quit date: 2013     Years since quittin.1    Smokeless tobacco: Never   Substance Use Topics    Alcohol use: Not Currently     Family History   Problem Relation Age of Onset    Stroke Mother     Dementia Mother     Diabetes Maternal Grandfather     Diabetes Daughter         type 1       Objective:   Neurological Examination:  /80   Pulse 70   Resp 16   Ht 68\"   Wt 199 lb (90.3 kg)   SpO2 98%   BMI 30.26 kg/m²   Mental status: A & O X 3  Language: no aphasia  Speech: no dysarthria  CN II-XII: intact   Motor strength: 5/5 all extremities  Tone: normal  Coordination: normal  Sensory: symmetric  Gait: uses cane    Test reviewed on 2024      Mag \"Jayesh\" MD Aleksander  Neurology  [facility]  2024, 10:53 AM  CC: No primary care provider on file.         [1] No Known Allergies

## 2024-12-05 ENCOUNTER — TELEPHONE (OUTPATIENT)
Facility: CLINIC | Age: 74
End: 2024-12-05

## 2024-12-05 NOTE — TELEPHONE ENCOUNTER
Received via fax from [employer] a request for a Diabetic Standard Written Order.  Form prefilled out and placed in APN's in basket for review and signature.

## 2024-12-11 ENCOUNTER — MED REC SCAN ONLY (OUTPATIENT)
Facility: CLINIC | Age: 74
End: 2024-12-11

## 2025-01-02 ENCOUNTER — LAB ENCOUNTER (OUTPATIENT)
Dept: LAB | Facility: HOSPITAL | Age: 75
End: 2025-01-02
Attending: NURSE PRACTITIONER
Payer: MEDICARE

## 2025-01-02 DIAGNOSIS — E55.9 VITAMIN D DEFICIENCY: ICD-10-CM

## 2025-01-02 DIAGNOSIS — Z79.4 TYPE 2 DIABETES MELLITUS WITH HYPERGLYCEMIA, WITH LONG-TERM CURRENT USE OF INSULIN (HCC): ICD-10-CM

## 2025-01-02 DIAGNOSIS — E11.65 TYPE 2 DIABETES MELLITUS WITH HYPERGLYCEMIA, WITH LONG-TERM CURRENT USE OF INSULIN (HCC): ICD-10-CM

## 2025-01-02 LAB
ANION GAP SERPL CALC-SCNC: 10 MMOL/L (ref 0–18)
BUN BLD-MCNC: 27 MG/DL (ref 9–23)
CALCIUM BLD-MCNC: 9.9 MG/DL (ref 8.7–10.4)
CHLORIDE SERPL-SCNC: 106 MMOL/L (ref 98–112)
CO2 SERPL-SCNC: 24 MMOL/L (ref 21–32)
CREAT BLD-MCNC: 1 MG/DL
EGFRCR SERPLBLD CKD-EPI 2021: 78 ML/MIN/1.73M2 (ref 60–?)
FASTING STATUS PATIENT QL REPORTED: NO
GLUCOSE BLD-MCNC: 146 MG/DL (ref 70–99)
OSMOLALITY SERPL CALC.SUM OF ELEC: 298 MOSM/KG (ref 275–295)
POTASSIUM SERPL-SCNC: 4.5 MMOL/L (ref 3.5–5.1)
SODIUM SERPL-SCNC: 140 MMOL/L (ref 136–145)
VIT D+METAB SERPL-MCNC: 43.9 NG/ML (ref 30–100)

## 2025-01-02 PROCEDURE — 84681 ASSAY OF C-PEPTIDE: CPT

## 2025-01-02 PROCEDURE — 80048 BASIC METABOLIC PNL TOTAL CA: CPT

## 2025-01-02 PROCEDURE — 82306 VITAMIN D 25 HYDROXY: CPT

## 2025-01-02 PROCEDURE — 36415 COLL VENOUS BLD VENIPUNCTURE: CPT

## 2025-01-03 LAB — C-PEPTIDE: 4.3 NG/ML

## 2025-01-08 DIAGNOSIS — E11.65 TYPE 2 DIABETES MELLITUS WITH HYPERGLYCEMIA, WITH LONG-TERM CURRENT USE OF INSULIN (HCC): ICD-10-CM

## 2025-01-08 DIAGNOSIS — Z79.4 TYPE 2 DIABETES MELLITUS WITH HYPERGLYCEMIA, WITH LONG-TERM CURRENT USE OF INSULIN (HCC): ICD-10-CM

## 2025-01-09 ENCOUNTER — TELEPHONE (OUTPATIENT)
Dept: INTERNAL MEDICINE CLINIC | Facility: CLINIC | Age: 75
End: 2025-01-09

## 2025-01-09 ENCOUNTER — OFFICE VISIT (OUTPATIENT)
Dept: INTERNAL MEDICINE CLINIC | Facility: CLINIC | Age: 75
End: 2025-01-09
Payer: MEDICARE

## 2025-01-09 VITALS
HEART RATE: 98 BPM | DIASTOLIC BLOOD PRESSURE: 82 MMHG | BODY MASS INDEX: 30.92 KG/M2 | TEMPERATURE: 97 F | SYSTOLIC BLOOD PRESSURE: 136 MMHG | WEIGHT: 204 LBS | HEIGHT: 68 IN | OXYGEN SATURATION: 95 %

## 2025-01-09 DIAGNOSIS — E11.9 TYPE 2 DIABETES MELLITUS WITHOUT COMPLICATION, WITHOUT LONG-TERM CURRENT USE OF INSULIN (HCC): Primary | ICD-10-CM

## 2025-01-09 DIAGNOSIS — I25.10 CORONARY ARTERY DISEASE INVOLVING NATIVE CORONARY ARTERY OF NATIVE HEART WITHOUT ANGINA PECTORIS: ICD-10-CM

## 2025-01-09 DIAGNOSIS — I48.91 ATRIAL FIBRILLATION, UNSPECIFIED TYPE (HCC): ICD-10-CM

## 2025-01-09 DIAGNOSIS — E78.5 DYSLIPIDEMIA: ICD-10-CM

## 2025-01-09 DIAGNOSIS — I10 PRIMARY HYPERTENSION: ICD-10-CM

## 2025-01-09 DIAGNOSIS — Z86.73 HISTORY OF CVA (CEREBROVASCULAR ACCIDENT): ICD-10-CM

## 2025-01-09 DIAGNOSIS — Z95.5 S/P DRUG ELUTING CORONARY STENT PLACEMENT: ICD-10-CM

## 2025-01-09 DIAGNOSIS — Z12.5 SCREENING FOR PROSTATE CANCER: ICD-10-CM

## 2025-01-09 PROCEDURE — 99203 OFFICE O/P NEW LOW 30 MIN: CPT | Performed by: FAMILY MEDICINE

## 2025-01-09 RX ORDER — APIXABAN 5 MG/1
5 TABLET, FILM COATED ORAL 2 TIMES DAILY
COMMUNITY
Start: 2024-12-16

## 2025-01-09 RX ORDER — METFORMIN HYDROCHLORIDE 500 MG/1
500 TABLET, EXTENDED RELEASE ORAL 3 TIMES DAILY
Qty: 270 TABLET | Refills: 0 | Status: SHIPPED | OUTPATIENT
Start: 2025-01-09

## 2025-01-09 NOTE — PROGRESS NOTES
Tahir Kapoor  4/15/1950    Chief Complaint   Patient presents with    hospitals Care     NP establish care  General health + a-fib + hx of stroke in July   Discuss medications        HPI:   Tahir Kapoor is a 74 year old male who presents to [facility]. Has history of MI about 10 years ago s/p SHAWANDA to the RCA. Had recent CVA in July. He had significant speech and walking difficulties which have now greatly improved. He had a loop recorder placed during his hospitalization and was started on Eliquis for afib and has upcoming evaluation with EP at [facility] later this month.     Current Outpatient Medications   Medication Sig Dispense Refill    METFORMIN  MG Oral Tablet 24 Hr TAKE 1 TABLET BY MOUTH IN THE MORNING AND 1 AT NOON AND 1 AT BEDTIME 270 tablet 0    ELIQUIS 5 MG Oral Tab Take 1 tablet (5 mg total) by mouth 2 (two) times daily.      semaglutide (OZEMPIC, 0.25 OR 0.5 MG/DOSE,) 2 MG/3ML Subcutaneous Solution Pen-injector Inject 0.25 mg into the skin once a week. 3 mL 2    insulin glargine (LANTUS SOLOSTAR) 100 UNIT/ML Subcutaneous Solution Pen-injector Inject 20 units per day, TDD max:33 units /day 30 mL 0    Continuous Glucose Sensor (FREESTYLE BRITTANY 3 SENSOR) Does not apply Misc 1 each every 14 (fourteen) days. 6 each 1    clopidogrel 75 MG Oral Tab Take 1 tablet (75 mg total) by mouth daily. 90 tablet 1    Glucose Blood (ONETOUCH VERIO) In Vitro Strip Test blood sugar 3 times daily, before meals and at bedtime 300 strip 1    Multiple Vitamin (MULTIVITAMINS OR) Take 1 tablet by mouth daily.      Insulin Pen Needle (NOVOFINE AUTOCOVER PEN NEEDLE) 30G X 8 MM Does not apply Misc Use to inject insulin up to 4 times per day 100 each 0    insulin aspart (NOVOLOG FLEXPEN) 100 Units/mL Subcutaneous Solution Pen-injector Test blood glucose 4 times daily before meals Inject 2 unit if blood glucose is between 141-180 mg/dL Inject 3 units if blood glucose is between 181-220 mg/dL Inject 6 units if blood  glucose is between 221-260 mg/dL Inject 8 units if blood glucose is between 261-300 mg/dL Inject 10 units if blood glucose is between 301-350 mg/dL Call your physician if blood glucose is greater than 351 mg/dL, 5 each 3    amLODIPine 2.5 MG Oral Tab Take 1 tablet (2.5 mg total) by mouth in the morning and 1 tablet (2.5 mg total) before bedtime.      losartan 50 MG Oral Tab Take 1 tablet (50 mg total) by mouth daily.      metoprolol succinate ER 50 MG Oral Tablet 24 Hr Take 1 tablet (50 mg total) by mouth daily.      atorvastatin 40 MG Oral Tab Take 1 tablet (40 mg total) by mouth nightly.      Microlet Lancets Does not apply Misc Test blood sugar four times daily before meals and at bedtime.  (Please make sure they are the purple lancets for the Micorlet device) 100 each 0      Allergies[1]   Past Medical History:    CAD (coronary artery disease)    Coronary atherosclerosis    Heart attack (HCC)    Hypertension      Patient Active Problem List   Diagnosis    Facial droop    Hypertension    Diabetes mellitus (HCC)    Gait instability    Cerebrovascular accident (CVA) (HCC)    Dyslipidemia    History of CVA (cerebrovascular accident)    S/P drug eluting coronary stent placement    Coronary artery disease involving native coronary artery of native heart without angina pectoris      Past Surgical History:   Procedure Laterality Date    Cath bare metal stent (bms)      Total hip replacement        Family History   Problem Relation Age of Onset    Stroke Mother     Dementia Mother     Diabetes Maternal Grandfather     Diabetes Daughter         type 1      Social History     Socioeconomic History    Marital status:    Tobacco Use    Smoking status: Former     Current packs/day: 0.00     Types: Cigarettes     Quit date: 2013     Years since quittin.2    Smokeless tobacco: Never   Vaping Use    Vaping status: Never Used   Substance and Sexual Activity    Alcohol use: Not Currently    Drug use: Not Currently    Other Topics Concern    Caffeine Concern Yes     Comment: 1/2 cup of coffee AM    Exercise No     Social Drivers of Health     Food Insecurity: No Food Insecurity (1/9/2025)    NCSS - Food Insecurity     Worried About Running Out of Food in the Last Year: No     Ran Out of Food in the Last Year: No   Transportation Needs: No Transportation Needs (1/9/2025)    NCSS - Transportation     Lack of Transportation: No   Housing Stability: Not At Risk (1/9/2025)    NCSS - Housing/Utilities     Has Housing: Yes     Worried About Losing Housing: No     Unable to Get Utilities: No         REVIEW OF SYSTEMS:   GENERAL: no recent illness, no new CP or dyspnea, no dizziness.     EXAM:   /82   Pulse 98   Temp 97 °F (36.1 °C) (Temporal)   Ht 5' 8\" (1.727 m)   Wt 204 lb (92.5 kg)   SpO2 95%   BMI 31.02 kg/m²   GENERAL: Well developed, well nourished,in no apparent distress  SKIN: No rash  HEENT: atraumatic, normocephalic  LUNGS: clear to auscultation  CARDIO: irregularly irregular rate    ASSESSMENT AND PLAN:   Tahir Kapoor is a 74 year old male who presents to establish care    CAD  S/P drug eluting coronary stent placement  Afib  Remote history of MI s/p SHAWANDA to the RCA. Recent diagnosis of Afib and has been started on Eliquis, borderline rate control on BB. Has upcoming appt to establish with EP at [facility].     History of CVA (cerebrovascular accident)  He has made great improvements in his aphasia and strength/gait. Following with neurology. On statin and Plavix. Off ASA now that he is on DOAC.      Type 2 diabetes mellitus without complication, without long-term current use of insulin (HCC)  Greatly improved glycemic control since his hospitalization in July. Following with endo.     Primary hypertension  Borderline control on current treatment. Will CTM. Follow-up in March    Dyslipidemia  On statin, lipid panel prior to follow-up in March    F/U in March for CPE    All questions were answered and the patient  agrees with the plan.     Thank you,  Jayesh Chavarria MD         [1] No Known Allergies

## 2025-01-09 NOTE — TELEPHONE ENCOUNTER
Patient called request labs prior to their annual physical.  Annual physical scheduled for 3/25/25 .   Please order labs. Patient preferred lab is [facility].  Patient informed request was sent to clinical team.  Patient informed to fast for labs.  No callback required.

## 2025-01-09 NOTE — TELEPHONE ENCOUNTER
Endocrine Refill protocol for metformin    Protocol Criteria:  PASSED  Reason: N/A    If all below requirements are met, send a 90-day supply with 1 refill per provider protocol.     Verify appointment with Endocrinology completed in the last 6 months or scheduled in the next 3 months.  Verify A1C has been completed within the last 6 months and is below 8.5%  Verify last GFR is greater than or equal to 40 in the past 12 months    Last completed office visit:11/6/2024 Gypsy Russo APRN   Next scheduled Follow up: No future appointments.    Last GFR result:    Lab Results   Component Value Date    EGFRCR 78 01/02/2025     Last A1c result: Last A1C result: 6.4% done 11/6/2024.

## 2025-01-21 ENCOUNTER — LAB ENCOUNTER (OUTPATIENT)
Dept: LAB | Age: 75
End: 2025-01-21
Attending: INTERNAL MEDICINE
Payer: MEDICARE

## 2025-01-21 DIAGNOSIS — I48.91 A-FIB (HCC): Primary | ICD-10-CM

## 2025-01-21 DIAGNOSIS — Z01.810 PREOP CARDIOVASCULAR EXAM: ICD-10-CM

## 2025-01-21 LAB
ANION GAP SERPL CALC-SCNC: 6 MMOL/L (ref 0–18)
BUN BLD-MCNC: 25 MG/DL (ref 9–23)
CALCIUM BLD-MCNC: 9.6 MG/DL (ref 8.7–10.6)
CHLORIDE SERPL-SCNC: 106 MMOL/L (ref 98–112)
CO2 SERPL-SCNC: 28 MMOL/L (ref 21–32)
CREAT BLD-MCNC: 1.02 MG/DL
EGFRCR SERPLBLD CKD-EPI 2021: 77 ML/MIN/1.73M2 (ref 60–?)
FASTING STATUS PATIENT QL REPORTED: NO
GLUCOSE BLD-MCNC: 140 MG/DL (ref 70–99)
OSMOLALITY SERPL CALC.SUM OF ELEC: 297 MOSM/KG (ref 275–295)
POTASSIUM SERPL-SCNC: 5.2 MMOL/L (ref 3.5–5.1)
SODIUM SERPL-SCNC: 140 MMOL/L (ref 136–145)

## 2025-01-21 PROCEDURE — 36415 COLL VENOUS BLD VENIPUNCTURE: CPT

## 2025-01-21 PROCEDURE — 80048 BASIC METABOLIC PNL TOTAL CA: CPT

## 2025-01-23 NOTE — PAT NURSING NOTE
PreOp Instructions     You are scheduled for: a Cardiac Procedure     Date of Procedure: 01/27/25     Diet Instructions: Do not eat or drink anything after midnight including gum, mints, candy, etc.     Medications: Medications you are allowed to take can be taken with a sip of water the morning of your procedure     Medications to Stop: Hold herbal supplements and vitamins morning of procedure     Diabetic Instructions: Do not take morning dose of your diabetic medications    Arrival Time: The day prior to your procedure you will receive a phone call before 6:00 pm with your arrival time. If you haven't received a phone call, please check your voicemail messages., If you did not receive a voice mail and it is after 6:00 pm, please call the nursing supervisor at [phone].    Driving After Procedure: Sedation will be given so you WILL NOT be able to drive home. You will need a responsible adult  to drive you home. You can NOT take uber or taxi unless approved by your physician in advance.     Discharge Teaching: Your nurse will give you specific instructions before discharge, Most people can resume normal activities in 2-3 days, Any questions, please call the physician's office

## 2025-01-27 ENCOUNTER — HOSPITAL ENCOUNTER (OUTPATIENT)
Dept: INTERVENTIONAL RADIOLOGY/VASCULAR | Facility: HOSPITAL | Age: 75
Discharge: HOME | End: 2025-01-27
Attending: INTERNAL MEDICINE | Admitting: INTERNAL MEDICINE
Payer: MEDICARE

## 2025-01-27 VITALS
OXYGEN SATURATION: 98 % | HEART RATE: 62 BPM | SYSTOLIC BLOOD PRESSURE: 128 MMHG | WEIGHT: 200 LBS | HEIGHT: 68 IN | TEMPERATURE: 98 F | BODY MASS INDEX: 30.31 KG/M2 | RESPIRATION RATE: 16 BRPM | DIASTOLIC BLOOD PRESSURE: 87 MMHG

## 2025-01-27 DIAGNOSIS — I48.91 A-FIB (HCC): ICD-10-CM

## 2025-01-27 LAB
ATRIAL RATE: 67 BPM
BASOPHILS # BLD AUTO: 0.08 X10(3) UL (ref 0–0.2)
BASOPHILS NFR BLD AUTO: 0.9 %
EOSINOPHIL # BLD AUTO: 0.5 X10(3) UL (ref 0–0.7)
EOSINOPHIL NFR BLD AUTO: 5.8 %
ERYTHROCYTE [DISTWIDTH] IN BLOOD BY AUTOMATED COUNT: 12.8 %
GLUCOSE BLD-MCNC: 148 MG/DL (ref 70–99)
HCT VFR BLD AUTO: 40 %
HGB BLD-MCNC: 13.6 G/DL
IMM GRANULOCYTES # BLD AUTO: 0.03 X10(3) UL (ref 0–1)
IMM GRANULOCYTES NFR BLD: 0.4 %
LYMPHOCYTES # BLD AUTO: 2.09 X10(3) UL (ref 1–4)
LYMPHOCYTES NFR BLD AUTO: 24.4 %
MCH RBC QN AUTO: 31.2 PG (ref 26–34)
MCHC RBC AUTO-ENTMCNC: 34 G/DL (ref 31–37)
MCV RBC AUTO: 91.7 FL
MONOCYTES # BLD AUTO: 0.59 X10(3) UL (ref 0.1–1)
MONOCYTES NFR BLD AUTO: 6.9 %
NEUTROPHILS # BLD AUTO: 5.26 X10 (3) UL (ref 1.5–7.7)
NEUTROPHILS # BLD AUTO: 5.26 X10(3) UL (ref 1.5–7.7)
NEUTROPHILS NFR BLD AUTO: 61.6 %
P AXIS: 15 DEGREES
P-R INTERVAL: 182 MS
PLATELET # BLD AUTO: 254 10(3)UL (ref 150–450)
POTASSIUM SERPL-SCNC: 4.3 MMOL/L (ref 3.5–5.1)
Q-T INTERVAL: 422 MS
QRS DURATION: 102 MS
QTC CALCULATION (BEZET): 445 MS
R AXIS: -14 DEGREES
RBC # BLD AUTO: 4.36 X10(6)UL
T AXIS: 41 DEGREES
VENTRICULAR RATE: 67 BPM
WBC # BLD AUTO: 8.6 X10(3) UL (ref 4–11)

## 2025-01-27 PROCEDURE — 85025 COMPLETE CBC W/AUTO DIFF WBC: CPT | Performed by: INTERNAL MEDICINE

## 2025-01-27 PROCEDURE — 93005 ELECTROCARDIOGRAM TRACING: CPT

## 2025-01-27 PROCEDURE — 92960 CARDIOVERSION ELECTRIC EXT: CPT | Performed by: INTERNAL MEDICINE

## 2025-01-27 PROCEDURE — 82962 GLUCOSE BLOOD TEST: CPT

## 2025-01-27 PROCEDURE — 93010 ELECTROCARDIOGRAM REPORT: CPT | Performed by: INTERNAL MEDICINE

## 2025-01-27 PROCEDURE — 84132 ASSAY OF SERUM POTASSIUM: CPT | Performed by: INTERNAL MEDICINE

## 2025-01-27 PROCEDURE — 99152 MOD SED SAME PHYS/QHP 5/>YRS: CPT | Performed by: INTERNAL MEDICINE

## 2025-01-27 PROCEDURE — 5A2204Z RESTORATION OF CARDIAC RHYTHM, SINGLE: ICD-10-PCS | Performed by: INTERNAL MEDICINE

## 2025-01-27 RX ORDER — SODIUM CHLORIDE 9 MG/ML
INJECTION, SOLUTION INTRAVENOUS CONTINUOUS
Status: DISCONTINUED | OUTPATIENT
Start: 2025-01-27 | End: 2025-01-27

## 2025-01-27 RX ORDER — METHOHEXITAL IN WATER/PF 100MG/10ML
SYRINGE (ML) INTRAVENOUS
Status: COMPLETED
Start: 2025-01-27 | End: 2025-01-27

## 2025-01-27 NOTE — PROCEDURES
Electrophysiology Procedure Note    Tahir Kapoor Location: Cath Lab   CSN [id number] MRN [id number]   Admission Date 1/27/2025  Operation Date 01/27/25    Attending Physician Eugene Arriola MD Operating Physician Eugene Arriola MD     Pre-Operative Diagnosis:Atrial Fibrillation    Post-Operative Diagnosis: Same as above  PROCEDURE(S) PERFORMED:    1.    Cardioversion.  2.     Sedation      :  Eugene Arriola MD     ANESTHESIA:  IV sedation.  Moderate conscious sedation for this procedure was administered and personally monitored. Brevital 40 mg  Sedation time: 10 minutes     INDICATION:  Persistent Atrial Fibrillation     COMPLICATIONS:  None.     METHODS:  The patient was brought to the outpatient cardiac telemetry unit in a fasting and nonsedated state after providing informed consent.  IV sedation was administered during continuous ECG, pulse oximeter and noninvasive hemodynamic monitoring.  After administering IV Brevital in intermittent boluses, the desired level of sedation was achieved.      Cardioversion Energy:  200J    Pad Position: Base-Morley  Pre- Cardioversion Rhythm- AF  Post Cardioversion Rhythm - NSR    CONCLUSIONS:  1.    Successful cardioversion       Plan:  1- Rhythm/Rate Control Meds:  no changes  2) Anticoagulation- no changes  3) Follow Up- 1 month                    Eugene Arriola MD     Cardiac Electrophysiololgy  [facility]

## 2025-01-27 NOTE — PROGRESS NOTES
Pt  post cardioversion  After adequate sedation per Dr. Arriola,pt cardioverted x 1 at 200j. Pt converted to NSR per EKG.    Pt awake.     Recovery complete per protocol. Vss. Discharge instructions reviewed, iv dc'd and pt discharged home with wife driving.

## 2025-01-27 NOTE — DISCHARGE INSTRUCTIONS
HOME CARE INSTRUCTIONS FOLLOWING CARDIOVERSION OR ICD EVALUATION      Activity:  - DO NOT drive after the procedure. You may resume driving after 24 hours.  - DO NOT operate any machinery (including kitchen appliances or power tools).  - Avoid drinking alcohol for 24 hours.  - You may resume your normal activity after 24 hours.    What is Normal:  - Your skin may be red where the patches were placed.  - The redness may last 2 to 3 days and feel like a \"sunburn\".  - You may treat the area with an over-the-counter cream that is used for sunburned skin.    Special Instructions:  - If you were taking the medication Eliquis, Xarelto, Pradaxa or Coumadin, it is very important to continue taking it until your follow-up appointment with your physician.    When you should NOTIFY YOUR PHYSICIAN:  - If you feel that you have returned to an irregular rhythm  - If you have an ICD, any time your ICD device fires    Other:  - You may resume your present diet, unless otherwise specified by your physician.  - You may resume all of your medications as prescribed, unless otherwise directed by your physician.  - A list of your medications was provided to you at discharge.  - Please call your physician's office for a follow-up appointment. You should be seen in 2 weeks.  - Do not make any personal/business decisions and/or sign any legal documents for the next 24 hours.

## 2025-01-27 NOTE — H&P
[facility]  Pre Procedure History and Physical    Tahir Kapoor Patient Status:  Outpatient    4/15/1950 MRN [id number]   Location [facility] INTERVENTIONAL SUITES Attending No att. providers found   Hosp Day # 0 PCP Jayesh Chavarria MD     Consults      History of Present Illness:  Tahir Kapoor is a a(n) 74 year old male here for DCCV      Prior H/P Reviewed with no changes    History:  Past Medical History:    A-fib (HCC)    Arrhythmia    CAD (coronary artery disease)    Coronary atherosclerosis    Heart attack (HCC)    Hypertension     Past Surgical History:   Procedure Laterality Date    Cath bare metal stent (bms)      Total hip replacement       Family History   Problem Relation Age of Onset    Stroke Mother     Dementia Mother     Diabetes Maternal Grandfather     Diabetes Daughter         type 1      reports that he quit smoking about 11 years ago. His smoking use included cigarettes. He has never used smokeless tobacco. He reports that he does not currently use alcohol. He reports that he does not currently use drugs.    Allergies:  Allergies[1]    Medications:    Current Facility-Administered Medications:     sodium chloride 0.9% infusion, , Intravenous, Continuous    OBJECTIVE      Physical Exam:  Physical Exam   Blood pressure 128/87, pulse 62, temperature 97.6 °F (36.4 °C), temperature source Temporal, resp. rate 16, height 5' 8\" (1.727 m), weight 200 lb (90.7 kg), SpO2 98%.  Temp (24hrs), Av.6 °F (36.4 °C), Min:97.6 °F (36.4 °C), Max:97.6 °F (36.4 °C)    Wt Readings from Last 3 Encounters:   25 200 lb (90.7 kg)   25 204 lb (92.5 kg)   24 199 lb (90.3 kg)       NAD  PERRLA/EOMI  Neck veins not elevated  Carotids- no bruits  CTA bilaterally  Cardiac- irreg irreg  Abdomen- Soft,Nontender, normal BS  Extremities- pulses normal  Edema- none  Mood /Affect Congruent  Skin- no lesions          Results:   Recent Labs   Lab 25  1015 25  1150   *   --    BUN 25*  --    CREATSERUM 1.02  --    EGFRCR 77  --    CA 9.6  --      --    K 5.2* 4.3     --    CO2 28.0  --      Recent Labs   Lab 01/27/25  1150   RBC 4.36   HGB 13.6   HCT 40.0   MCV 91.7   MCH 31.2   MCHC 34.0   RDW 12.8   NEPRELIM 5.26   WBC 8.6   .0         [unfilled]  No results for input(s): \"BNP\" in the last 168 hours.  Lab Results   Component Value Date    INR 0.90 07/15/2024     No results found for: \"TROP\"      No results found.       Assessment and Plan    Patient Active Problem List   Diagnosis    Facial droop    Hypertension    Diabetes mellitus (HCC)    Gait instability    Cerebrovascular accident (CVA) (HCC)    Dyslipidemia    History of CVA (cerebrovascular accident)    S/P drug eluting coronary stent placement    Coronary artery disease involving native coronary artery of native heart without angina pectoris       Consent: Risks, Benefits and alternatives discussed in clinic. Reverified on the day of the procedure    Procedure Planned: DCCV    Sedation Plan: brevital    Discharge Plan: Same day      Eugene Arriola MD  Cardiac Electrophysiology  [facility]  1/27/2025  2:15 PM         [1] No Known Allergies

## 2025-01-28 DIAGNOSIS — Z79.4 TYPE 2 DIABETES MELLITUS WITH HYPERGLYCEMIA, WITH LONG-TERM CURRENT USE OF INSULIN (HCC): ICD-10-CM

## 2025-01-28 DIAGNOSIS — E11.65 TYPE 2 DIABETES MELLITUS WITH HYPERGLYCEMIA, WITH LONG-TERM CURRENT USE OF INSULIN (HCC): ICD-10-CM

## 2025-01-28 RX ORDER — INSULIN GLARGINE 100 [IU]/ML
INJECTION, SOLUTION SUBCUTANEOUS
Qty: 24 ML | Refills: 1 | Status: SHIPPED | OUTPATIENT
Start: 2025-01-28

## 2025-01-28 RX ORDER — SEMAGLUTIDE 0.68 MG/ML
0.25 INJECTION, SOLUTION SUBCUTANEOUS WEEKLY
Qty: 3 ML | Refills: 1 | Status: SHIPPED | OUTPATIENT
Start: 2025-01-28

## 2025-01-28 NOTE — TELEPHONE ENCOUNTER
Endocrine Refill protocol for oral and injectable diabetic medications    Protocol Criteria:  PASSED  Reason: N/A    If all below requirements are met, send a 90-day supply with 1 refill per provider protocol.    Verify appointment with Endocrinology completed in the last 6 months or scheduled in the next 3 months.  Verify A1C has been completed within the last 6 months and is below 8.5%     Last completed office visit: 11/6/2024 Gypsy Russo APRN   Next scheduled Follow up:   Future Appointments   Date Time Provider Department Center   3/25/2025  9:30 AM Jayesh Chavarria MD [facility]      Last A1c result: Last A1c value was 6.4% done 11/6/2024.       Passed and sent per protocol.

## 2025-01-30 ENCOUNTER — TELEPHONE (OUTPATIENT)
Facility: CLINIC | Age: 75
End: 2025-01-30

## 2025-01-30 RX ORDER — INSULIN GLARGINE 100 [IU]/ML
22 INJECTION, SOLUTION SUBCUTANEOUS EVERY 24 HOURS
Qty: 30 ML | Refills: 0 | Status: SHIPPED | OUTPATIENT
Start: 2025-01-30

## 2025-01-30 NOTE — TELEPHONE ENCOUNTER
Received fax from [email or web address] regarding pts prescription for SMGLEE (YFGN) 100 UNIT/ML PEN.  Fax states that patient only received a temporary fill for his medication due to this medication is not covered by insurance plan.  Alternative medications that are covered include: LANTUS, TOUJEO SOLOSTAR, and TRESIBA.    Will forward to APN for review, and place forms in APN's in basket.

## 2025-02-13 ENCOUNTER — MED REC SCAN ONLY (OUTPATIENT)
Facility: CLINIC | Age: 75
End: 2025-02-13

## 2025-03-23 ENCOUNTER — TELEPHONE (OUTPATIENT)
Dept: NEUROLOGY | Facility: CLINIC | Age: 75
End: 2025-03-23

## 2025-03-23 RX ORDER — CLOPIDOGREL BISULFATE 75 MG/1
75 TABLET ORAL DAILY
Qty: 30 TABLET | Refills: 0 | Status: SHIPPED | OUTPATIENT
Start: 2025-03-23 | End: 2025-03-23

## 2025-03-23 RX ORDER — CLOPIDOGREL BISULFATE 75 MG/1
75 TABLET ORAL DAILY
Qty: 1 TABLET | Refills: 0 | Status: SHIPPED | OUTPATIENT
Start: 2025-03-23

## 2025-03-23 NOTE — TELEPHONE ENCOUNTER
Patient called for Plavix refill. Neurology note from 2024 reviewed ASA and Plavix, but patient is also on eliquis for recent onset afib. Patient reports started on Eliquis a few months ago, and had been taking this and Plavix, but stopped ASA a few months ago. Is seeing cardiology. Told patient I would update Dr. Armijo, and give 30 day refill.

## 2025-03-23 NOTE — ADDENDUM NOTE
Addended by: IMER BARBOZA on: 3/23/2025 03:16 PM     Modules accepted: Orders     Patient called for a refill of Morphine 30MG Tab. Pharmacy is [facility] in [facility].

## 2025-03-24 RX ORDER — CLOPIDOGREL BISULFATE 75 MG/1
75 TABLET ORAL DAILY
Qty: 90 TABLET | Refills: 0 | OUTPATIENT
Start: 2025-03-24

## 2025-03-24 NOTE — TELEPHONE ENCOUNTER
Spoke with the pharmacy who states that the patient obtain this medication yesterday.      Medication:  CLOPIDOGREL 75 MG Oral Tab     Date of last refill: 03/23/2025 (#30/0)  [place]     Last office visit: 11/27/2024  Due back to clinic per last office note:  Around 08/27/2025  Date next office visit scheduled:    Future Appointments   Date Time Provider Department Center   4/9/2025  6:00 AM [facility]   4/29/2025  1:00 PM Jayesh Chavarria MD [facility]           Last OV note recommendation:    Plan:  Fall precaution  Cont current antiplatelets asa 325 mg , plavix + statin  Cardiologist to follow   Stroke education given  Stroke risk factors management by primary   See orders and medications filed with this encounter. The patient indicates understanding of these issues and agrees with the plan.  Discussed with patient regarding assessment, care plan   RTC 9 -12 months  Pt should go ER for any new or worsening symptoms and contact office      A total of 40 minutes were spent on patient care,  more than 50% was spent counseling patient/family regarding studies' results, assessment, treatment options and care plan, 10 minutes were spent in (pre- and/or during visit) reviewing clinical data including imaging, labs and progress notes related to therapy or treatment.

## 2025-04-03 NOTE — PAT NURSING NOTE
PreOp Instructions     You are scheduled for: a Cardiac Procedure     Date of Procedure: 04/09/25     Diet Instructions: Do not eat or drink anything after midnight including gum, mints, candy, etc the night before your procedure.     Medications: Medications you are allowed to take can be taken with a sip of water the morning of your procedure. DO NOT MISS any Eliquis doses.     Medications to Stop: DO NOT TAKE Metformin and any herbal supplements and vitamins the morning of your procedure.     Arrival Time: The day prior to your procedure you will receive a phone call between 3:00 pm -  6:00 pm with your arrival time. If you haven't received a phone call, please check your voicemail messages., If you did not receive a voice mail and it is after 6:00 pm, please call the nursing supervisor at [phone].    Driving After Procedure: Sedation will be given so you WILL NOT be able to drive home. You will need a responsible adult  to drive you home. You can NOT take uber or taxi unless approved by your physician in advance.     Discharge Teaching: Your nurse will give you specific instructions before discharge, Any questions, please call the physician's office

## 2025-04-07 DIAGNOSIS — Z79.4 TYPE 2 DIABETES MELLITUS WITH HYPERGLYCEMIA, WITH LONG-TERM CURRENT USE OF INSULIN (HCC): ICD-10-CM

## 2025-04-07 DIAGNOSIS — E11.65 TYPE 2 DIABETES MELLITUS WITH HYPERGLYCEMIA, WITH LONG-TERM CURRENT USE OF INSULIN (HCC): ICD-10-CM

## 2025-04-09 ENCOUNTER — HOSPITAL ENCOUNTER (OUTPATIENT)
Dept: INTERVENTIONAL RADIOLOGY/VASCULAR | Facility: HOSPITAL | Age: 75
Discharge: HOME | End: 2025-04-09
Attending: INTERNAL MEDICINE | Admitting: INTERNAL MEDICINE
Payer: MEDICARE

## 2025-04-09 VITALS
HEIGHT: 68 IN | WEIGHT: 200 LBS | OXYGEN SATURATION: 96 % | HEART RATE: 61 BPM | RESPIRATION RATE: 18 BRPM | SYSTOLIC BLOOD PRESSURE: 132 MMHG | DIASTOLIC BLOOD PRESSURE: 72 MMHG | BODY MASS INDEX: 30.31 KG/M2

## 2025-04-09 DIAGNOSIS — I48.91 A-FIB (HCC): ICD-10-CM

## 2025-04-09 LAB
ATRIAL RATE: 58 BPM
GLUCOSE BLD-MCNC: 121 MG/DL (ref 70–99)
P AXIS: 20 DEGREES
P-R INTERVAL: 196 MS
Q-T INTERVAL: 474 MS
QRS DURATION: 106 MS
QTC CALCULATION (BEZET): 465 MS
R AXIS: -22 DEGREES
T AXIS: 19 DEGREES
VENTRICULAR RATE: 58 BPM

## 2025-04-09 PROCEDURE — 93010 ELECTROCARDIOGRAM REPORT: CPT | Performed by: INTERNAL MEDICINE

## 2025-04-09 PROCEDURE — 82962 GLUCOSE BLOOD TEST: CPT

## 2025-04-09 PROCEDURE — 93005 ELECTROCARDIOGRAM TRACING: CPT

## 2025-04-09 PROCEDURE — 5A2204Z RESTORATION OF CARDIAC RHYTHM, SINGLE: ICD-10-PCS | Performed by: INTERNAL MEDICINE

## 2025-04-09 PROCEDURE — 99152 MOD SED SAME PHYS/QHP 5/>YRS: CPT | Performed by: INTERNAL MEDICINE

## 2025-04-09 PROCEDURE — 92960 CARDIOVERSION ELECTRIC EXT: CPT | Performed by: INTERNAL MEDICINE

## 2025-04-09 RX ORDER — METHOHEXITAL IN WATER/PF 100MG/10ML
SYRINGE (ML) INTRAVENOUS
Status: COMPLETED
Start: 2025-04-09 | End: 2025-04-09

## 2025-04-09 RX ORDER — SODIUM CHLORIDE 9 MG/ML
INJECTION, SOLUTION INTRAVENOUS CONTINUOUS
Status: DISCONTINUED | OUTPATIENT
Start: 2025-04-09 | End: 2025-04-09

## 2025-04-09 NOTE — PLAN OF CARE
Patient had an elective cardioversion today with Dr. Arriola. Patient received brevital for sedation. See bedside procedure flowsheet for specifics. Patient converted to sinus rhythm, confirmed with 12 lead EKG. Post procedure, patient JONAS, A/O x3. Patient's wife @ bedside. Patient tolerating po intake. Recovery time completed. Discharge instructions reviewed. IV D/C'd. Patient discharged to [facility] by wheelchair with belongings. Patient's wife is .

## 2025-04-09 NOTE — PROCEDURES
Electrophysiology Procedure Note    Tahir Kapoor Location: Cath Lab   CSN [id number] MRN [id number]   Admission Date 4/9/2025  Operation Date 04/09/25    Attending Physician Eugene Arriola MD Operating Physician Eugene Arriola MD     Pre-Operative Diagnosis:Atrial Fibrillation    Post-Operative Diagnosis: Same as above  PROCEDURE(S) PERFORMED:    1.    Cardioversion.  2.     Sedation      :  Eugene Arriola MD     ANESTHESIA:  IV sedation.  Moderate conscious sedation for this procedure was administered and personally monitored. Brevital 40 mg  Sedation time: 10 minutes     INDICATION:  Persistent Atrial Fibrillation     COMPLICATIONS:  None.     METHODS:  The patient was brought to the outpatient cardiac telemetry unit in a fasting and nonsedated state after providing informed consent.  IV sedation was administered during continuous ECG, pulse oximeter and noninvasive hemodynamic monitoring.  After administering IV Brevital in intermittent boluses, the desired level of sedation was achieved.      Cardioversion Energy:  200J    Pad Position: Base-Dana  Pre- Cardioversion Rhythm- AF  Post Cardioversion Rhythm - SR    CONCLUSIONS:  1.    Successful cardioversion       Plan:  1- Rhythm/Rate Control Meds:  no changes  2) Anticoagulation- no changes  3) Follow Up- 1 month                    Eugene Arriola MD     Cardiac Electrophysiololgy  [facility]

## 2025-04-09 NOTE — H&P
[place]  Pre Procedure History and Physical    Tahir Kapoor Patient Status:  Outpatient    4/15/1950 MRN [id number]   [facility]IT[facility] Attending Eugene Arriola MD   Hosp Day # 0 PCP Jayesh Chavarria MD     Consults      History of Present Illness:  Tahir Kapoor is a a(n) 74 year old male here for DCCV      Prior H/P Reviewed with no changes    History:  Past Medical History[1]  Past Surgical History[2]  Family History[3]   reports that he quit smoking about 11 years ago. His smoking use included cigarettes. He has never used smokeless tobacco. He reports that he does not currently use alcohol. He reports that he does not currently use drugs.    Allergies:  Allergies[4]    Medications:  Current Hospital Medications[5]    OBJECTIVE      Physical Exam:  Physical Exam   Height 5' 8\" (1.727 m), weight 200 lb (90.7 kg).  No data recorded.    Wt Readings from Last 3 Encounters:   25 200 lb (90.7 kg)   25 200 lb (90.7 kg)   25 204 lb (92.5 kg)       NAD  PERRLA/EOMI  Neck veins not elevated  Carotids- no bruits  CTA bilaterally  Cardiac- irreg irreg  Abdomen- Soft,Nontender, normal BS  Extremities- pulses normal  Edema- none  Mood /Affect Congruent  Skin- no lesions          Results:   No results for input(s): \"GLU\", \"BUN\", \"CREATSERUM\", \"GFRAA\", \"GFRNAA\", \"EGFRCR\", \"CA\", \"NA\", \"K\", \"CL\", \"CO2\" in the last 168 hours.  No results for input(s): \"RBC\", \"HGB\", \"HCT\", \"MCV\", \"MCH\", \"MCHC\", \"RDW\", \"NEPRELIM\", \"WBC\", \"PLT\" in the last 168 hours.      [unfilled]  No results for input(s): \"BNP\" in the last 168 hours.  Lab Results   Component Value Date    INR 0.90 07/15/2024     No results found for: \"TROP\"      No results found.       Assessment and Plan    Problem List[6]    Consent: Risks, Benefits and alternatives discussed in clinic. Reverified on the day of the procedure    Procedure Planned: DCCV    Sedation Plan: Brevital    Discharge Plan: Same  day      Eugene Arriola MD  Cardiac Electrophysiology  [facility]  4/9/2025  12:55 PM         [1]   Past Medical History:   A-fib ([facility])    Arrhythmia    CAD (coronary artery disease)    Coronary atherosclerosis    Heart attack (HCC)    High blood pressure    High cholesterol    Hypertension   [2]   Past Surgical History:  Procedure Laterality Date    Cath bare metal stent (bms)      Total hip replacement     [3]   Family History  Problem Relation Age of Onset    Stroke Mother     Dementia Mother     Diabetes Maternal Grandfather     Diabetes Daughter         type 1   [4] No Known Allergies  [5] No current facility-administered medications for this encounter.  [6]   Patient Active Problem List  Diagnosis    Facial droop    Hypertension    Diabetes mellitus ([facility])    Gait instability    Cerebrovascular accident (CVA) ([facility])    Dyslipidemia    History of CVA (cerebrovascular accident)    S/P drug eluting coronary stent placement    Coronary artery disease involving native coronary artery of native heart without angina pectoris

## 2025-04-10 ENCOUNTER — TELEPHONE (OUTPATIENT)
Facility: CLINIC | Age: 75
End: 2025-04-10

## 2025-04-10 NOTE — TELEPHONE ENCOUNTER
Received fax for a refill request for METFORMIN  TAB.    Endocrine Refill protocol for metformin    Protocol Criteria:  PASSED  Reason: N/A    If all below requirements are met, send a 90-day supply with 1 refill per provider protocol.     Verify appointment with Endocrinology completed in the last 6 months or scheduled in the next 3 months.  Verify A1C has been completed within the last 6 months and is below 8.5%  Verify last GFR is greater than or equal to 40 in the past 12 months    Last completed office visit:11/6/2024 Gypsy Russo APRN   Next scheduled Follow up:   Future Appointments   Date Time Provider Department Center   4/29/2025  1:00 PM Jayesh Chavarria MD [facility]       Last GFR result:    Lab Results   Component Value Date    EGFRCR 77 01/21/2025     Last A1c result: Last A1C result: 6.4% done 11/6/2024.

## 2025-04-11 RX ORDER — METFORMIN HYDROCHLORIDE 500 MG/1
TABLET, EXTENDED RELEASE ORAL
Qty: 270 TABLET | Refills: 1 | Status: SHIPPED | OUTPATIENT
Start: 2025-04-11

## 2025-04-11 NOTE — TELEPHONE ENCOUNTER
Endocrine Refill protocol for metformin    Protocol Criteria:  PASSED  Reason: N/A    If all below requirements are met, send a 90-day supply with 1 refill per provider protocol.     Verify appointment with Endocrinology completed in the last 6 months or scheduled in the next 3 months.  Verify A1C has been completed within the last 6 months and is below 8.5%  Verify last GFR is greater than or equal to 40 in the past 12 months    Last completed office visit:11/6/2024 Gypsy Russo APRN   Next scheduled Follow up:   Future Appointments   Date Time Provider Department Center   4/29/2025  1:00 PM Jayesh Chavarria MD [facility]       Last GFR result:    Lab Results   Component Value Date    EGFRCR 77 01/21/2025     Last A1c result: Last A1C result: 6.4% done 11/6/2024.

## 2025-04-16 NOTE — TELEPHONE ENCOUNTER
Medication:  CLOPIDOGREL 75 MG Oral Tab      Date of last refill: 03/23/2025 (#30/0)  [place]     Last office visit: 11/27/2024  Due back to clinic per last office note:  9-12 Months   Date next office visit scheduled:    Future Appointments   Date Time Provider Department Center   4/29/2025  1:00 PM Jayesh Chavarria MD [facility]           Last OV note recommendation:    Assessment:       ICD-10-CM     1. Cerebrovascular accident (CVA), unspecified mechanism (HCC)  I63.9         2. Gait disorder  R26.9         Multifocal punctate acute/subacute infarcts involving the left cerebellar hemisphere and left posterior medulla. Patient clinically improving        Plan:  Fall precaution  Cont current antiplatelets asa 325 mg , plavix + statin  Cardiologist to follow   Stroke education given  Stroke risk factors management by primary   See orders and medications filed with this encounter. The patient indicates understanding of these issues and agrees with the plan.  Discussed with patient regarding assessment, care plan   RTC 9 -12 months  Pt should go ER for any new or worsening symptoms and contact office      A total of 40 minutes were spent on patient care,  more than 50% was spent counseling patient/family regarding studies' results, assessment, treatment options and care plan, 10 minutes were spent in (pre- and/or during visit) reviewing clinical data including imaging, labs and progress notes related to therapy or treatment.

## 2025-04-17 RX ORDER — CLOPIDOGREL BISULFATE 75 MG/1
75 TABLET ORAL DAILY
Qty: 30 TABLET | Refills: 0 | Status: SHIPPED | OUTPATIENT
Start: 2025-04-17

## 2025-05-13 ENCOUNTER — OFFICE VISIT (OUTPATIENT)
Facility: CLINIC | Age: 75
End: 2025-05-13
Payer: MEDICARE

## 2025-05-13 VITALS
SYSTOLIC BLOOD PRESSURE: 130 MMHG | HEIGHT: 68 IN | DIASTOLIC BLOOD PRESSURE: 86 MMHG | BODY MASS INDEX: 30.31 KG/M2 | WEIGHT: 200 LBS | HEART RATE: 56 BPM | OXYGEN SATURATION: 94 %

## 2025-05-13 DIAGNOSIS — E11.65 TYPE 2 DIABETES MELLITUS WITH HYPERGLYCEMIA, WITH LONG-TERM CURRENT USE OF INSULIN (HCC): Primary | ICD-10-CM

## 2025-05-13 DIAGNOSIS — E11.69 HYPERLIPIDEMIA ASSOCIATED WITH TYPE 2 DIABETES MELLITUS (HCC): ICD-10-CM

## 2025-05-13 DIAGNOSIS — E66.811 CLASS 1 OBESITY DUE TO EXCESS CALORIES WITH SERIOUS COMORBIDITY AND BODY MASS INDEX (BMI) OF 30.0 TO 30.9 IN ADULT: ICD-10-CM

## 2025-05-13 DIAGNOSIS — E11.69 TYPE 2 DIABETES MELLITUS WITH OTHER SPECIFIED COMPLICATION, WITH LONG-TERM CURRENT USE OF INSULIN (HCC): ICD-10-CM

## 2025-05-13 DIAGNOSIS — Z79.4 TYPE 2 DIABETES MELLITUS WITH HYPERGLYCEMIA, WITH LONG-TERM CURRENT USE OF INSULIN (HCC): Primary | ICD-10-CM

## 2025-05-13 DIAGNOSIS — I15.2 HYPERTENSION ASSOCIATED WITH DIABETES (HCC): ICD-10-CM

## 2025-05-13 DIAGNOSIS — E78.5 HYPERLIPIDEMIA ASSOCIATED WITH TYPE 2 DIABETES MELLITUS (HCC): ICD-10-CM

## 2025-05-13 DIAGNOSIS — E66.09 CLASS 1 OBESITY DUE TO EXCESS CALORIES WITH SERIOUS COMORBIDITY AND BODY MASS INDEX (BMI) OF 30.0 TO 30.9 IN ADULT: ICD-10-CM

## 2025-05-13 DIAGNOSIS — Z79.4 TYPE 2 DIABETES MELLITUS WITH OTHER SPECIFIED COMPLICATION, WITH LONG-TERM CURRENT USE OF INSULIN (HCC): ICD-10-CM

## 2025-05-13 DIAGNOSIS — E11.59 HYPERTENSION ASSOCIATED WITH DIABETES (HCC): ICD-10-CM

## 2025-05-13 LAB — HEMOGLOBIN A1C: 6.9 % (ref 4.3–5.6)

## 2025-05-13 PROCEDURE — 99215 OFFICE O/P EST HI 40 MIN: CPT | Performed by: NURSE PRACTITIONER

## 2025-05-13 PROCEDURE — 95251 CONT GLUC MNTR ANALYSIS I&R: CPT | Performed by: NURSE PRACTITIONER

## 2025-05-13 PROCEDURE — 83036 HEMOGLOBIN GLYCOSYLATED A1C: CPT | Performed by: NURSE PRACTITIONER

## 2025-05-13 RX ORDER — INSULIN GLARGINE 100 [IU]/ML
10 INJECTION, SOLUTION SUBCUTANEOUS EVERY 24 HOURS
Qty: 15 ML | Refills: 0 | Status: SHIPPED | OUTPATIENT
Start: 2025-05-13

## 2025-05-13 RX ORDER — AMIODARONE HYDROCHLORIDE 200 MG/1
200 TABLET ORAL DAILY
COMMUNITY
Start: 2025-03-10

## 2025-05-13 RX ORDER — DAPAGLIFLOZIN 5 MG/1
5 TABLET, FILM COATED ORAL DAILY
Qty: 90 TABLET | Refills: 0 | Status: SHIPPED | OUTPATIENT
Start: 2025-05-13 | End: 2025-08-11

## 2025-05-13 NOTE — PATIENT INSTRUCTIONS
Return Visit:  APN: Return in about 3 months (around 8/13/2025) for diabetes follow up.  [] Video visit  [x] In person only    [x]  Directions to 1st floor lab    []  Give blood sugar log  []  PAP paperwork for Ozempic/Jardiance (english/Korean)         Summary of today's visit:  Medication changes:    Increase ozempic from  0.25 to 0.5 mg weekly  Decrease Metformin from 1500, to 1000 mg per day  Start Farxiga 5 mg  /day   Decrease Lantus from 22 to 10 units per day     - check glucose with fingerstick machine if sensor says 70 and below to confirm real hypoglycemia.     - Schedule eye exam prior to your follow up visit  Ophthalmologist for diabetes eye exam:  [facility] ( with various locations:[facility], [place], [place], [place]) :  -  [phone] or [phone]    [place]:   - [facility] ( ophthalmology): Phone: [phone], Address: [address] [place] [address]  - [facility](accepts MEDICAID) ( ophthalmology)- [phone]  - [place] [facility]: Dr. Pittman or Dr. Ambrosio  ( ophthalmology)- [phone]  -[facility]: Dr Arianne Tse- PHONE: [phone], [address], [place] [address]    [place]:  - Dr. Oppenheim( ophthalmology)- [phone]  - [facility] ( optometrist)- [phone]- [facility], or [employer] [place]: [facility]( optometrist) - [phone]    [place]: Jayesh Wilkinson- [facility] ( optometrist)- [phone] they can do screenings using their machine    [place]: [facility] ( optometrist)- [phone]     [place]: Babita Page MD ( ophthalmology) -  [phone]      - If we started a new medication today that may not be covered by your insurance, our staff will reach out to you regarding any changes in the plan   - If on insulin, please ensure you have glucagon at home for emergencies   - Targeted  glucose levels  - before breakfast or fasting glucose (at least 8-10 hrs of no food/sweetened beverage intake)    -  2 hrs after lunch or dinner < 180   -  before lunch or dinner <140  -  Follow 15g/15 min rule if you develop any hypoglycemia symptoms w/ glucose <70   -  but if glucose <55 follow 30g/15 min rule. Once glucose above 80, eat a protein or food w protein ie cheese, peanut butter, almond butter, cheese, yogurt.     Medication changes  Start Taking               dapagliflozin (FARXIGA) 5 MG Oral Tab Take 1 tablet (5 mg total) by mouth daily.          These Medications Have Changed       Start Taking Instead of    LANTUS SOLOSTAR 100 UNIT/ML Subcutaneous Solution Pen-injector LANTUS SOLOSTAR 100 UNIT/ML Subcutaneous Solution Pen-injector    Dosage:  Inject 10 Units into the skin daily. TDD max 15 unit per day Actively titrating per endo instruction - Subcutaneous Dosage:  Inject 22 Units into the skin daily. TDD max 32 unit per day - Subcutaneous    metFORMIN  MG Oral Tablet 24 Hr metFORMIN  MG Oral Tablet 24 Hr    Dosage:  Take 1 tablet twice a day by mouth Dosage:  TAKE 1 TABLET BY MOUTH IN THE MORNING AND 1 TABLET AT NOON AND 1 TABLET AT BEDTIME, PATIENT NEEDS FOLLOW UP FOR MORE REFILLS    Starting on: 5/15/2025     semaglutide (OZEMPIC, 0.25 OR 0.5 MG/DOSE,) 2 MG/3ML Subcutaneous Solution Pen-injector semaglutide (OZEMPIC, 0.25 OR 0.5 MG/DOSE,) 2 MG/3ML Subcutaneous Solution Pen-injector    Dosage:  Inject 0.5 mg into the skin once a week. - Subcutaneous Dosage:  Inject 0.25 mg into the skin once a week. - Subcutaneous    Starting on: 5/15/2025                 Additional comments:   - If labs were ordered today, please complete prior to your follow up visit -fasting   - Please let us know if you require any refills at least 1 week prior to your medication running out   - Please call our office if sugars at home are consistently greater than 250 or less than 70     HOW TO TREAT LOW  BLOOD SUGAR (Hypoglycemia)  Low blood sugar= Less than 70, or if you start to have symptoms (below)  Symptoms: Shaking or trembling, fast heart rate, extreme hunger, sweating, confusion/difficulty concentrating, dizziness.    How to treat a low blood sugar if you are able to eat/drink: The Rule of 15/15  If you are using continuous glucose monitor that says you are low, but you do not have any symptoms, verify on fingerstick that your blood sugar is actually low before treating.   Eat 15 grams of carbs (see examples below)  Check your blood sugar after 15 minutes. If it’s still below your target range, have another serving.   Repeat these steps until it’s in your target range. Once it’s in range, if you're nervous about your sugar going low again, have a protein source (ie, a spoonful of peanut butter).   If you have a CGM you want to look for how your arrow has changed. If you arrow is pointed up or sideways after 15 min, give your CGM more time OR check with a finger stick. Try not to eat more food until at least 15 min after the first BG check - otherwise you risk having a rebound high.  If you are experiencing symptoms and you are unable to check your blood glucose for any reason, treat the hypoglycemia.  If someone has a low blood sugar and is unconscious: Don’t hesitate to call 911. If someone is unconscious and glucagon is not available or someone does not know how to use it, call 911 immediately.     To treat a low glucose <70, I recommend you carry with you easy, pre-portioned treatment for low blood sugars that are 15G of carbs:   - Children sized squeeze pouch applesauce (low fiber)  - Small children's sized juicebox- 15g carb --> 4oz juice box  - Glucose tablets from [facility], you can find them near diabetes supplies --> Note, you will need to eat 3-4 tablets to get to 15g of carbs  - Children sized fruit snack pack- look for one with 15 grams of total carbohydrate  - Choice of how to treat your low  is important. Complex carbs, or foods that contain fats along with carbs (like chocolate) can slow the absorption of glucose and should NOT be used to treat an emergency low

## 2025-05-13 NOTE — PROGRESS NOTES
[facility] Note    Name: Tahir Kapoor    Date: 25    Tahir Kapoor is a 75 year old male who presents for evaluation of T2DM management.     Chief complaint: Follow - Up (PT here for routine T2DM f/u, per pt wants to discuss treatment plan/Per pt checks BG via Rosa M with reader/Last A1c value was 6.4% done 2024.)       Subjective:   DM hx:  -Diagnosed with diabetes in July 15, 2024 while he was hospitalized due to stroke  -Family history- yes, MGF ad daughter     Initial HPI consult in 2024  Pt was recently discharged from the hospital last 24 due to stroke  Per his wife, pt noted fatigue prior to hospitalization.   DM meds at first office visit: Lantus 27 units at bedtime ( started 27 last Friday, was 22 units prior to that), Novolog 2-3 units TID  Blood Glucose log reviewed. Checking 4 times per day. Morning/fastin-27, episodes of hypoglycemia: No  -Re: potential DM medication contraindication (if positive, checkbox selected):  [] History of pancreatitis- denies   [] Personal/fam hx of medullary thyroid cancer/MEN2 denies  [] History of recent/frequent UTI/yeast infxn- denies   [] Previous amputation related to diabetes- denies     -Presence of associated DM complications (if positive, checkbox selected):  [x] Macrovascular complications (CAD/CVA/PAD)- CVA; atrial Fib started Dec, 2024  [] Neuropathy -denies   [] Retinopathy-denies  [] Nephropathy- denies  [x] HTN  [x] Hyperlipidemia  [x] Stroke/TIA-   [] Gastroparesis- denies   Denies DKA     -Lifestyle: eats potato chips, bread  - Modifying factors:    Previously trialed/failed DM meds: denies     INTERIM HX:    24 Current treatment: Lantus 20u qhs, used Novolog twice in the last month 2-3 units used twice in the last month ,  ozempic 0.5 mg weekly ( this will be his 2nd week), Metformin 500 mg twice daily; Testing: freestyle rosa m 3 Hypoglycemia:   denies   Continuous Glucose Monitoring  Interpretation  Tahir Kapoor  has undergone continuous glucose monitoring with the Freestyle Rosa M 3 CGM with phone (connected to clinic profile).  The blood glucose tracings were evaluated for two weeks prior to office visit. Blood glucose tracings demonstrated no significant hyperglycemia. There were no areas of hypoglycemia notedduring the weeks of evaluation.    Date: 8/27-9/9/24: TIR 94%, GMI 6.6% ,  low 0% , very low 0%, SD 18.8mg/dl, Sensor usage: 97%  Complication: no hospitalization since last visit.     11/06/24: In the last few days, his rosa m became faulty and he called already rosa m company and waiting for replacement. He started fingerstick machine. States he cannot tolerate 0.5 mg of ozempic and wants to go back to 0.25  Current treatment:  Lantus to 18 units, Metformin 1500 mg/day, Ozempic 0.5 mg weekly  Testing: patient unable to remember levels  Hypoglycemia: 70  New complication related to DM:   no hospitalization and no ER visit since last office visit.     INTERIM HX with BUZZ Betancur 05/13/25 :    Here for follow up   Current treatment: Lantus 22 units every evening,  Ozempic 0.25 mg weekly, Metformin 1500 mg per day  Testing: Continuous Glucose Monitoring Interpretation  Tahir Kapoor  has undergone continuous glucose monitoring with the Freestyle Rosa M 3 CGM with reader.  The blood glucose tracings were evaluated for two weeks prior to office visit. Blood glucose tracings demonstrated no significant hyperglycemia. There were occasional hypoglycemia, particularly before brakfastduring the weeks of evaluation.    Date: 4/30-5/13/25: TIR 99%, GMI 6.1% ,  low 0% , very low 0%, GV 20.7%, Sensor usage: 99%    Hypoglycemia: as above   Complication:  no hospitalization and no ER visit since last office visit.     History/Other:    Allergies, PMH, SocHx and FHx reviewed and updated as appropriate in Epic on    metFORMIN  MG Oral Tablet 24 Hr Take 1 tablet twice a day by mouth 180  tablet 0    semaglutide (OZEMPIC, 0.25 OR 0.5 MG/DOSE,) 2 MG/3ML Subcutaneous Solution Pen-injector Inject 0.5 mg into the skin once a week. 6 mL 1    amiodarone 200 MG Oral Tab Take 1 tablet (200 mg total) by mouth daily.      dapagliflozin (FARXIGA) 5 MG Oral Tab Take 1 tablet (5 mg total) by mouth daily. 90 tablet 0    LANTUS SOLOSTAR 100 UNIT/ML Subcutaneous Solution Pen-injector Inject 10 Units into the skin daily. TDD max 15 unit per day Actively titrating per endo instruction 15 mL 0    CLOPIDOGREL 75 MG Oral Tab Take 1 tablet by mouth once daily 30 tablet 0    ELIQUIS 5 MG Oral Tab Take 1 tablet (5 mg total) by mouth in the morning and 1 tablet (5 mg total) before bedtime.      Continuous Glucose Sensor (FREESTYLE BRITTANY 3 SENSOR) Does not apply Misc 1 each every 14 (fourteen) days. 6 each 1    Glucose Blood (ONETOUCH VERIO) In Vitro Strip Test blood sugar 3 times daily, before meals and at bedtime 300 strip 1    Multiple Vitamin (MULTIVITAMINS OR) Take 1 tablet by mouth daily.      Microlet Lancets Does not apply Misc Test blood sugar four times daily before meals and at bedtime.  (Please make sure they are the purple lancets for the Micorlet device) 100 each 0    Insulin Pen Needle (NOVOFINE AUTOCOVER PEN NEEDLE) 30G X 8 MM Does not apply Misc Use to inject insulin up to 4 times per day 100 each 0    amLODIPine 2.5 MG Oral Tab Take 1 tablet (2.5 mg total) by mouth in the morning and 1 tablet (2.5 mg total) before bedtime.      losartan 50 MG Oral Tab Take 1 tablet (50 mg total) by mouth in the morning.      metoprolol succinate ER 50 MG Oral Tablet 24 Hr Take 1 tablet (50 mg total) by mouth in the morning.      atorvastatin 40 MG Oral Tab Take 1 tablet (40 mg total) by mouth nightly.       No Known Allergies  Current Outpatient Medications   Medication Sig Dispense Refill    metFORMIN  MG Oral Tablet 24 Hr Take 1 tablet twice a day by mouth 180 tablet 0    semaglutide (OZEMPIC, 0.25 OR 0.5 MG/DOSE,) 2  MG/3ML Subcutaneous Solution Pen-injector Inject 0.5 mg into the skin once a week. 6 mL 1    amiodarone 200 MG Oral Tab Take 1 tablet (200 mg total) by mouth daily.      dapagliflozin (FARXIGA) 5 MG Oral Tab Take 1 tablet (5 mg total) by mouth daily. 90 tablet 0    LANTUS SOLOSTAR 100 UNIT/ML Subcutaneous Solution Pen-injector Inject 10 Units into the skin daily. TDD max 15 unit per day Actively titrating per endo instruction 15 mL 0    CLOPIDOGREL 75 MG Oral Tab Take 1 tablet by mouth once daily 30 tablet 0    ELIQUIS 5 MG Oral Tab Take 1 tablet (5 mg total) by mouth in the morning and 1 tablet (5 mg total) before bedtime.      Continuous Glucose Sensor (FREESTYLE BRITTANY 3 SENSOR) Does not apply Misc 1 each every 14 (fourteen) days. 6 each 1    Glucose Blood (ONETOUCH VERIO) In Vitro Strip Test blood sugar 3 times daily, before meals and at bedtime 300 strip 1    Multiple Vitamin (MULTIVITAMINS OR) Take 1 tablet by mouth daily.      Microlet Lancets Does not apply Misc Test blood sugar four times daily before meals and at bedtime.  (Please make sure they are the purple lancets for the Micorlet device) 100 each 0    Insulin Pen Needle (NOVOFINE AUTOCOVER PEN NEEDLE) 30G X 8 MM Does not apply Misc Use to inject insulin up to 4 times per day 100 each 0    amLODIPine 2.5 MG Oral Tab Take 1 tablet (2.5 mg total) by mouth in the morning and 1 tablet (2.5 mg total) before bedtime.      losartan 50 MG Oral Tab Take 1 tablet (50 mg total) by mouth in the morning.      metoprolol succinate ER 50 MG Oral Tablet 24 Hr Take 1 tablet (50 mg total) by mouth in the morning.      atorvastatin 40 MG Oral Tab Take 1 tablet (40 mg total) by mouth nightly.       Past Medical History:    A-fib (HCC)    Arrhythmia    CAD (coronary artery disease)    Coronary atherosclerosis    Heart attack (HCC)    High blood pressure    High cholesterol    Hypertension     Family History   Problem Relation Age of Onset    Stroke Mother     Dementia  Mother     Diabetes Maternal Grandfather     Diabetes Daughter         type 1     Social history: Reviewed.      ROS/Exam    REVIEW OF SYSTEMS: Ten point review of systems has been performed and is otherwise negative and/or non-contributory, except as described above.     VITALS  Vitals:    05/13/25 1416   BP: 130/86   Pulse: 56   SpO2: 94%   Weight: 200 lb (90.7 kg)   Height: 5' 8\" (1.727 m)         Wt Readings from Last 6 Encounters:   05/13/25 200 lb (90.7 kg)   04/03/25 200 lb (90.7 kg)   01/23/25 200 lb (90.7 kg)   01/09/25 204 lb (92.5 kg)   11/27/24 199 lb (90.3 kg)   11/06/24 195 lb (88.5 kg)       PHYSICAL EXAM  CONSTITUTIONAL:  awake, alert, cooperative, no apparent distress, and appears stated age, obese  PSYCH: normal affect  LUNGS: breathing comfortably  CARDIOVASCULAR:  regular rate   NECK:  no palpable thyroid nodules, no lymphadenopathy       Labs/Imaging:    Lab Results   Component Value Date    CHOLEST 140 07/16/2024    CHOLEST 186 07/12/2024    TRIG 207 (H) 07/16/2024    TRIG 202 (H) 07/12/2024    HDL 43 07/16/2024    HDL 55 07/12/2024    LDL 63 07/16/2024    LDL 97 07/12/2024     Lab Results   Component Value Date    MICROALBCREA 3.8 10/24/2024      Lab Results   Component Value Date    CREATSERUM 1.02 01/21/2025    CREATSERUM 1.00 01/02/2025    EGFRCR 77 01/21/2025    EGFRCR 78 01/02/2025     Lab Results   Component Value Date    AST 17 07/15/2024    AST 16 07/12/2024    ALT 30 07/15/2024    ALT 36 07/12/2024       Lab Results   Component Value Date    TSH 1.941 08/09/2024    TSH 1.640 10/26/2021         Overall glucose control:  Lab Results   Component Value Date    A1C 6.9 (A) 05/13/2025    A1C 6.4 (A) 11/06/2024    A1C 10.3 (H) 07/15/2024       Supplementary Documentation:   -Surveillance for Diabetes Complications & Risks  Foot exam/neuropathy: Last Foot Exam: 07/29/24  NL exam    Retinopathy screening: Last Dilated Eye Exam: 07/01/22  Eye Exam shows Diabetic Retinopathy?: No  Has  appointment with [facility] on Dec 4, 2024    Assessment & Plan:     ICD-10-CM    1. Type 2 diabetes mellitus with hyperglycemia, with long-term current use of insulin ([facility])  E11.65 POC Hemoglobin A1C    Z79.4 TSH and Free T4     dapagliflozin (FARXIGA) 5 MG Oral Tab     LANTUS SOLOSTAR 100 UNIT/ML Subcutaneous Solution Pen-injector     GLUC MNTR CONT REC FROM NTRSTL TISS FLU PHYS I&R     metFORMIN  MG Oral Tablet 24 Hr     semaglutide (OZEMPIC, 0.25 OR 0.5 MG/DOSE,) 2 MG/3ML Subcutaneous Solution Pen-injector      2. Hypertension associated with diabetes ([facility])  E11.59     I15.2       3. Hyperlipidemia associated with type 2 diabetes mellitus ([facility])  E11.69 Lipid Panel    E78.5       4. Type 2 diabetes mellitus with other specified complication, with long-term current use of insulin ([facility])  E11.69     Z79.4       5. Class 1 obesity due to excess calories with serious comorbidity and body mass index (BMI) of 30.0 to 30.9 in adult  E66.811     E66.09     Z68.30           Tahir Kapoor is a pleasant 75 year old male here for evaluation of:    #Diabetes- PMHx of Type 2 diabetes mellitus diagnosed last 7/17/24 after he was dx w/ CVA.   - Last A1c value was 6.9% done 5/13/2025.     -  Discussed Goal <7%. Importance of better glucose control in preventing onset/progression of end-organ damage discussed, as well as goals of therapy and clinical significance of A1C.  Plan: A1c at goal. Adding farxiga, discussed its benefits and side effects and how to avoid side effects. Increasing ozempic while decreasing the dose of Metformin and his basal insulin, since noted some hypoglycemia in his dexcom. To check yearly tfts before next visit  - med changes:  Increase ozempic from  0.25 to 0.5 mg weekly  Decrease Metformin from 1500, to 1000 mg per day  Start Farxiga 5 mg  /day   Decrease Lantus from 22 to 10 units per day  - check glucose with fingerstick machine if sensor says 70 and below to confirm real  hypoglycemia.   - - Recommended to patient to see ophthalmology/optometrist, given the patient list of ophthalmology/optometrist  - continue Freestyle Rosa M 3 CGM with reader  - patient is on insulin, and has suboptimal glycemic control including wide glycemic swings, thus would benefit from CGM  - continue to check BG 3x/day using glucometer or CGM   - Bring your glucometer  or  for your CGM ( dexcom/rosa m) in all office visits  -See above header \"Supplementary Documentation\" for surveillance for diabetes complications & risks    #Nephropathy screening:  on losartan .   Lab Results   Component Value Date    EGFRCR 77 01/21/2025    MICROALBCREA 3.8 10/24/2024      #HTN: SBP is not to goal <130 per PCP  BP Readings from Last 1 Encounters:   05/13/25 130/86   BP Meds: amLODIPine Tabs - 2.5 MG; losartan Tabs - 50 MG; metoprolol succinate ER Tb24 - 50 MG   Plan: - Discuss to check bp at home, goal of BP< 130/80, decrease sodium intake. Continue BP meds and if consistently elevated to address to provider titrating his BP meds.     #Hyperlipidemia/Lipids: LDL is to goal, per PCP   Lab Results   Component Value Date    LDL 63 07/16/2024    TRIG 207 (H) 07/16/2024   Cholesterol Meds: atorvastatin Tabs - 40 MG    Plan: repeating lipid     #BMI 30 /Class 1 obesity due to excess calories/ Type 2 DM with other specified complications   - titrating up ozempic  - obesity is complicating underlying diabetes , and side effect of additional weight gain from increase use of insulin, reinforced importance of glycemic control, see back in 12 weeks   - discussed balanced diet: carbohydrates, protein, healthy fats and fiber in each meal. Exercise of at least 150 mins each week. Decrease your simple carbohydrates intake such as sweets: cookies, soda, candy, white rice, white pasta, syrups    #CVA  - per PCP, on ASA, eliquis, amiodarone and metoprolol    Return in about 3 months (around 8/13/2025) for diabetes follow up.  Total  time spent  45 minutes today on obtaining history, reviewing pertinent labs, evaluating patient, providing multiple treatment options, reinforcing diet/exercise and compliance, and completing documentation, of which greater than 50% was spent in face to face discussion with the patient  who demonstrated understanding and agreement with plan.     Thank you for allowing me to participate in the care of this patient.  Please feel free to contact me with any questions.    BUZZ Gómez, [facility]-BC  Endocrinology, Diabetes & Metabolism   5/13/25    In reviewing this note, please be advised that Dragon Voice Recognition software used to dictate the note may have made errors in recognizing some of the words or phrases.     Note to patient: The 21 Century Cures Act makes medical notes like these available to patients in the interest of transparency. However, be advised this is a medical document. It is intended as peer to peer communication. It is written in medical language and may contain abbreviations or verbiage that are unfamiliar. It may appear blunt or direct. Medical documents are intended to carry relevant information, facts as evident, and the clinical opinion of the practitioner.

## 2025-05-15 ENCOUNTER — TELEPHONE (OUTPATIENT)
Facility: CLINIC | Age: 75
End: 2025-05-15

## 2025-05-15 RX ORDER — SEMAGLUTIDE 0.68 MG/ML
0.5 INJECTION, SOLUTION SUBCUTANEOUS WEEKLY
Qty: 6 ML | Refills: 1 | Status: SHIPPED | OUTPATIENT
Start: 2025-05-15

## 2025-05-15 RX ORDER — METFORMIN HYDROCHLORIDE 500 MG/1
TABLET, EXTENDED RELEASE ORAL
Qty: 180 TABLET | Refills: 0 | Status: SHIPPED | OUTPATIENT
Start: 2025-05-15

## 2025-05-15 NOTE — TELEPHONE ENCOUNTER
Received fax from [facility] requesting last progress note for continued CGM supplies coverage. Per fax an order is due to ship but need chart notes prior shipment.  Will fax once chart notes are signed by APN    APN aware.

## 2025-05-15 NOTE — TELEPHONE ENCOUNTER
Requested chart notes faxed to [facility] at [phone].    Fax confirmation received, closing Encounter

## 2025-05-16 RX ORDER — CLOPIDOGREL BISULFATE 75 MG/1
75 TABLET ORAL DAILY
Qty: 30 TABLET | Refills: 0 | Status: SHIPPED | OUTPATIENT
Start: 2025-05-16

## 2025-05-16 NOTE — TELEPHONE ENCOUNTER
Medication:  CLOPIDOGREL 75 MG Oral Tab      Date of last refill: 04/17/2025 (#30/0)  [place]     Last office visit: 11/27/2024  Due back to clinic per last office note:  9 months   Date next office visit scheduled:    Future Appointments   Date Time Provider Department Center   5/29/2025  1:00 PM Jayesh Chavarria MD [facility]           Last OV note recommendation:    Assessment:       ICD-10-CM     1. Cerebrovascular accident (CVA), unspecified mechanism (HCC)  I63.9         2. Gait disorder  R26.9         Multifocal punctate acute/subacute infarcts involving the left cerebellar hemisphere and left posterior medulla. Patient clinically improving        Plan:  Fall precaution  Cont current antiplatelets asa 325 mg , plavix + statin  Cardiologist to follow   Stroke education given  Stroke risk factors management by primary   See orders and medications filed with this encounter. The patient indicates understanding of these issues and agrees with the plan.  Discussed with patient regarding assessment, care plan   RTC 9 -12 months  Pt should go ER for any new or worsening symptoms and contact office      A total of 40 minutes were spent on patient care,  more than 50% was spent counseling patient/family regarding studies' results, assessment, treatment options and care plan, 10 minutes were spent in (pre- and/or during visit) reviewing clinical data including imaging, labs and progress notes related to therapy or treatment.

## 2025-06-10 DIAGNOSIS — E11.65 TYPE 2 DIABETES MELLITUS WITH HYPERGLYCEMIA, WITH LONG-TERM CURRENT USE OF INSULIN (HCC): ICD-10-CM

## 2025-06-10 DIAGNOSIS — Z79.4 TYPE 2 DIABETES MELLITUS WITH HYPERGLYCEMIA, WITH LONG-TERM CURRENT USE OF INSULIN (HCC): ICD-10-CM

## 2025-06-10 DIAGNOSIS — I63.9 CEREBROVASCULAR ACCIDENT (CVA), UNSPECIFIED MECHANISM (HCC): Primary | ICD-10-CM

## 2025-06-10 RX ORDER — DAPAGLIFLOZIN 5 MG/1
5 TABLET, FILM COATED ORAL DAILY
Qty: 90 TABLET | Refills: 0 | Status: SHIPPED | OUTPATIENT
Start: 2025-06-10

## 2025-06-10 RX ORDER — CLOPIDOGREL BISULFATE 75 MG/1
75 TABLET ORAL DAILY
Qty: 30 TABLET | Refills: 2 | Status: SHIPPED | OUTPATIENT
Start: 2025-06-10

## 2025-06-10 NOTE — TELEPHONE ENCOUNTER
Endocrine Refill protocol for Farxiga    Protocol Criteria:  PASSED  Reason: N/A    If all below requirements are met, send a 90-day supply with 1 refill per provider protocol.     Verify appointment with Endocrinology completed in the last 6 months or scheduled in the next 3 months.  Verify A1C has been completed within the last 6 months and is below 8.5%  Verify last GFR is greater than or equal to 40 in the past 12 months    Last completed office visit:5/13/2025 Gypsy Russo APRN   Last completed telemed visit: Visit date not found  Next scheduled Follow up:   Future Appointments   Date Time Provider Department Center   6/27/2025  9:00 AM Jayesh Chavarria MD [facility]       Last GFR result:    Lab Results   Component Value Date    EGFRCR 77 01/21/2025     Last A1c result: Last A1C result: 6.9% done 5/13/2025.

## 2025-06-10 NOTE — TELEPHONE ENCOUNTER
Medication: CLOPIDOGREL 75 MG Oral Tab      Date of last refill: 05/16/2025 (#30/0)  [place]     Last office visit: 11/27/2024  Due back to clinic per last office note:  Around 08/27/2025  Date next office visit scheduled:    Future Appointments   Date Time Provider Department Center   6/27/2025  9:00 AM Jayesh Chavarria MD [facility]           Last OV note recommendation:    Plan:  Fall precaution  Cont current antiplatelets asa 325 mg , plavix + statin  Cardiologist to follow   Stroke education given  Stroke risk factors management by primary   See orders and medications filed with this encounter. The patient indicates understanding of these issues and agrees with the plan.  Discussed with patient regarding assessment, care plan   RTC 9 -12 months  Pt should go ER for any new or worsening symptoms and contact office

## 2025-07-08 ENCOUNTER — TELEPHONE (OUTPATIENT)
Facility: CLINIC | Age: 75
End: 2025-07-08

## 2025-07-15 ENCOUNTER — MED REC SCAN ONLY (OUTPATIENT)
Facility: CLINIC | Age: 75
End: 2025-07-15

## 2025-08-12 ENCOUNTER — LAB ENCOUNTER (OUTPATIENT)
Dept: LAB | Age: 75
End: 2025-08-12
Attending: FAMILY MEDICINE

## 2025-08-12 DIAGNOSIS — E11.9 TYPE 2 DIABETES MELLITUS WITHOUT COMPLICATION, WITHOUT LONG-TERM CURRENT USE OF INSULIN (HCC): ICD-10-CM

## 2025-08-12 DIAGNOSIS — E11.69 HYPERLIPIDEMIA ASSOCIATED WITH TYPE 2 DIABETES MELLITUS (HCC): ICD-10-CM

## 2025-08-12 DIAGNOSIS — I48.91 ATRIAL FIBRILLATION, UNSPECIFIED TYPE (HCC): ICD-10-CM

## 2025-08-12 DIAGNOSIS — I25.10 CORONARY ARTERY DISEASE INVOLVING NATIVE CORONARY ARTERY OF NATIVE HEART WITHOUT ANGINA PECTORIS: ICD-10-CM

## 2025-08-12 DIAGNOSIS — Z79.4 TYPE 2 DIABETES MELLITUS WITH HYPERGLYCEMIA, WITH LONG-TERM CURRENT USE OF INSULIN (HCC): ICD-10-CM

## 2025-08-12 DIAGNOSIS — Z12.5 SCREENING FOR PROSTATE CANCER: ICD-10-CM

## 2025-08-12 DIAGNOSIS — Z95.5 S/P DRUG ELUTING CORONARY STENT PLACEMENT: ICD-10-CM

## 2025-08-12 DIAGNOSIS — E78.5 HYPERLIPIDEMIA ASSOCIATED WITH TYPE 2 DIABETES MELLITUS (HCC): ICD-10-CM

## 2025-08-12 DIAGNOSIS — E11.65 TYPE 2 DIABETES MELLITUS WITH HYPERGLYCEMIA, WITH LONG-TERM CURRENT USE OF INSULIN (HCC): ICD-10-CM

## 2025-08-12 LAB
ALBUMIN SERPL-MCNC: 4.5 G/DL (ref 3.2–4.8)
ALBUMIN/GLOB SERPL: 1.7 (ref 1–2)
ALP LIVER SERPL-CCNC: 83 U/L (ref 45–117)
ALT SERPL-CCNC: 35 U/L (ref 10–49)
ANION GAP SERPL CALC-SCNC: 8 MMOL/L (ref 0–18)
AST SERPL-CCNC: 16 U/L (ref ?–34)
BASOPHILS # BLD AUTO: 0.12 X10(3) UL (ref 0–0.2)
BASOPHILS NFR BLD AUTO: 1 %
BILIRUB SERPL-MCNC: 0.3 MG/DL (ref 0.2–1.1)
BUN BLD-MCNC: 23 MG/DL (ref 9–23)
CALCIUM BLD-MCNC: 9.3 MG/DL (ref 8.7–10.6)
CHLORIDE SERPL-SCNC: 103 MMOL/L (ref 98–112)
CHOLEST SERPL-MCNC: 119 MG/DL (ref ?–200)
CO2 SERPL-SCNC: 28 MMOL/L (ref 21–32)
COMPLEXED PSA SERPL-MCNC: 0.82 NG/ML (ref ?–4)
CREAT BLD-MCNC: 1.09 MG/DL (ref 0.7–1.3)
CREAT UR-SCNC: 82 MG/DL
EGFRCR SERPLBLD CKD-EPI 2021: 70 ML/MIN/1.73M2 (ref 60–?)
EOSINOPHIL # BLD AUTO: 0.76 X10(3) UL (ref 0–0.7)
EOSINOPHIL NFR BLD AUTO: 6.2 %
ERYTHROCYTE [DISTWIDTH] IN BLOOD BY AUTOMATED COUNT: 13.2 %
EST. AVERAGE GLUCOSE BLD GHB EST-MCNC: 146 MG/DL (ref 68–126)
FASTING PATIENT LIPID ANSWER: YES
FASTING STATUS PATIENT QL REPORTED: YES
GLOBULIN PLAS-MCNC: 2.7 G/DL (ref 2–3.5)
GLUCOSE BLD-MCNC: 173 MG/DL (ref 70–99)
HBA1C MFR BLD: 6.7 % (ref ?–5.7)
HCT VFR BLD AUTO: 41.4 % (ref 39–53)
HDLC SERPL-MCNC: 32 MG/DL (ref 40–59)
HGB BLD-MCNC: 14.1 G/DL (ref 13–17.5)
IMM GRANULOCYTES # BLD AUTO: 0.04 X10(3) UL (ref 0–1)
IMM GRANULOCYTES NFR BLD: 0.3 %
LDLC SERPL CALC-MCNC: 49 MG/DL (ref ?–100)
LYMPHOCYTES # BLD AUTO: 3.41 X10(3) UL (ref 1–4)
LYMPHOCYTES NFR BLD AUTO: 27.8 %
MCH RBC QN AUTO: 31.6 PG (ref 26–34)
MCHC RBC AUTO-ENTMCNC: 34.1 G/DL (ref 31–37)
MCV RBC AUTO: 92.8 FL (ref 80–100)
MICROALBUMIN UR-MCNC: <0.3 MG/DL
MONOCYTES # BLD AUTO: 1 X10(3) UL (ref 0.1–1)
MONOCYTES NFR BLD AUTO: 8.1 %
NEUTROPHILS # BLD AUTO: 6.95 X10 (3) UL (ref 1.5–7.7)
NEUTROPHILS # BLD AUTO: 6.95 X10(3) UL (ref 1.5–7.7)
NEUTROPHILS NFR BLD AUTO: 56.6 %
NONHDLC SERPL-MCNC: 87 MG/DL (ref ?–130)
OSMOLALITY SERPL CALC.SUM OF ELEC: 296 MOSM/KG (ref 275–295)
PLATELET # BLD AUTO: 276 10(3)UL (ref 150–450)
POTASSIUM SERPL-SCNC: 4.8 MMOL/L (ref 3.5–5.1)
PROT SERPL-MCNC: 7.2 G/DL (ref 5.7–8.2)
RBC # BLD AUTO: 4.46 X10(6)UL (ref 3.8–5.8)
SODIUM SERPL-SCNC: 139 MMOL/L (ref 136–145)
T4 FREE SERPL-MCNC: 1.5 NG/DL (ref 0.8–1.7)
TRIGL SERPL-MCNC: 241 MG/DL (ref 30–149)
TSI SER-ACNC: 6.85 UIU/ML (ref 0.55–4.78)
VLDLC SERPL CALC-MCNC: 34 MG/DL (ref 0–30)
WBC # BLD AUTO: 12.3 X10(3) UL (ref 4–11)

## 2025-08-12 PROCEDURE — 82570 ASSAY OF URINE CREATININE: CPT

## 2025-08-12 PROCEDURE — 84443 ASSAY THYROID STIM HORMONE: CPT

## 2025-08-12 PROCEDURE — 85025 COMPLETE CBC W/AUTO DIFF WBC: CPT

## 2025-08-12 PROCEDURE — 36415 COLL VENOUS BLD VENIPUNCTURE: CPT

## 2025-08-12 PROCEDURE — 82043 UR ALBUMIN QUANTITATIVE: CPT

## 2025-08-12 PROCEDURE — 83036 HEMOGLOBIN GLYCOSYLATED A1C: CPT

## 2025-08-12 PROCEDURE — 84439 ASSAY OF FREE THYROXINE: CPT

## 2025-08-12 PROCEDURE — 80061 LIPID PANEL: CPT

## 2025-08-12 PROCEDURE — 80053 COMPREHEN METABOLIC PANEL: CPT

## 2025-08-29 ENCOUNTER — OFFICE VISIT (OUTPATIENT)
Age: 75
End: 2025-08-29

## 2025-08-29 DIAGNOSIS — I10 ESSENTIAL HYPERTENSION, BENIGN: Chronic | ICD-10-CM

## 2025-08-29 DIAGNOSIS — M16.12 PRIMARY OSTEOARTHRITIS OF LEFT HIP: Chronic | ICD-10-CM

## 2025-08-29 DIAGNOSIS — Z86.73 HISTORY OF CVA (CEREBROVASCULAR ACCIDENT): ICD-10-CM

## 2025-08-29 DIAGNOSIS — I48.19 PERSISTENT ATRIAL FIBRILLATION (HCC): ICD-10-CM

## 2025-08-29 DIAGNOSIS — I25.10 ATHEROSCLEROSIS OF NATIVE CORONARY ARTERY OF NATIVE HEART WITHOUT ANGINA PECTORIS: ICD-10-CM

## 2025-08-29 DIAGNOSIS — Z95.5 S/P DRUG ELUTING CORONARY STENT PLACEMENT: ICD-10-CM

## 2025-08-29 DIAGNOSIS — R94.6 ABNORMAL THYROID FUNCTION TEST: ICD-10-CM

## 2025-08-29 DIAGNOSIS — Z00.00 ENCOUNTER FOR ANNUAL HEALTH EXAMINATION: Primary | ICD-10-CM

## 2025-08-29 DIAGNOSIS — E78.2 MIXED HYPERLIPIDEMIA: Chronic | ICD-10-CM

## 2025-08-29 DIAGNOSIS — Z79.4 TYPE 2 DIABETES MELLITUS WITH OTHER CIRCULATORY COMPLICATION, WITH LONG-TERM CURRENT USE OF INSULIN (HCC): ICD-10-CM

## 2025-08-29 DIAGNOSIS — E11.59 TYPE 2 DIABETES MELLITUS WITH OTHER CIRCULATORY COMPLICATION, WITH LONG-TERM CURRENT USE OF INSULIN (HCC): ICD-10-CM

## 2025-08-29 DIAGNOSIS — I77.810 DILATION OF THORACIC AORTA: ICD-10-CM

## 2025-08-29 PROBLEM — I63.9 CEREBROVASCULAR ACCIDENT (CVA) (HCC): Status: RESOLVED | Noted: 2024-07-17 | Resolved: 2025-08-29

## 2025-08-29 PROBLEM — M16.10 PRIMARY LOCALIZED OSTEOARTHROSIS OF THE HIP: Status: ACTIVE | Noted: 2025-08-29

## 2025-08-29 PROBLEM — Z01.818 PRE-OPERATIVE EXAM: Status: ACTIVE | Noted: 2021-11-15

## 2025-08-29 RX ORDER — PEN NEEDLE, DIABETIC 32GX 5/32"
1 NEEDLE, DISPOSABLE MISCELLANEOUS AS DIRECTED
COMMUNITY
Start: 2025-07-15

## 2025-08-30 VITALS
BODY MASS INDEX: 30.89 KG/M2 | RESPIRATION RATE: 16 BRPM | HEART RATE: 62 BPM | WEIGHT: 203.81 LBS | HEIGHT: 68 IN | DIASTOLIC BLOOD PRESSURE: 70 MMHG | OXYGEN SATURATION: 98 % | SYSTOLIC BLOOD PRESSURE: 130 MMHG

## (undated) NOTE — Clinical Note
Full Rosa M report in your box for review (printed) you are seeing him again on 9/9, would you like him to reduce the long acting at before then?  He is not taking any short acting at this time, having symptoms with ozempic still